# Patient Record
Sex: FEMALE | Race: WHITE | NOT HISPANIC OR LATINO | Employment: FULL TIME | URBAN - NONMETROPOLITAN AREA
[De-identification: names, ages, dates, MRNs, and addresses within clinical notes are randomized per-mention and may not be internally consistent; named-entity substitution may affect disease eponyms.]

---

## 2017-03-07 ENCOUNTER — ALLIED HEALTH/NURSE VISIT (OUTPATIENT)
Dept: FAMILY MEDICINE | Facility: OTHER | Age: 32
End: 2017-03-07

## 2017-03-07 DIAGNOSIS — Z11.1 SCREENING EXAMINATION FOR PULMONARY TUBERCULOSIS: Primary | ICD-10-CM

## 2017-03-07 PROCEDURE — 86580 TB INTRADERMAL TEST: CPT

## 2017-03-07 PROCEDURE — 99207 ZZC NO CHARGE NURSE ONLY: CPT

## 2017-03-07 NOTE — NURSING NOTE
The patient is asked the following questions today and these are her answers:    -Have you had a mantoux administered in the past 30 days?    No  -Have you had a previous positive Mantoux.  No  -Have you received BCG in the past.  No  -Have you had a live vaccine  (MMR, Varicella, OPV, Yellow Fever) in the last 6 weeks.  No  -Have you had and active  viral or bacterial infection in the past 6 weeks.  No  -Have you received corticosteroids or immunosuppressive agents in the past 6 weeks.  No  -Have you been diagnosed with HIV?  No  -Do you have a maglinancy?  No      Patient here for mantoux intradermal test. Mantoux was placed on left forearm on 3/7/2017 at 9:55 AM.  Patient instructed to come back in 48-72 hours for results.  Prior to injection verified patient identity using patient's name and date of birth.  Meg Gavin MA     3/7/2017

## 2017-03-08 ENCOUNTER — OFFICE VISIT (OUTPATIENT)
Dept: FAMILY MEDICINE | Facility: OTHER | Age: 32
End: 2017-03-08
Payer: COMMERCIAL

## 2017-03-08 VITALS
HEART RATE: 92 BPM | WEIGHT: 161.7 LBS | SYSTOLIC BLOOD PRESSURE: 108 MMHG | TEMPERATURE: 96.7 F | HEIGHT: 66 IN | RESPIRATION RATE: 20 BRPM | DIASTOLIC BLOOD PRESSURE: 74 MMHG | OXYGEN SATURATION: 99 % | BODY MASS INDEX: 25.99 KG/M2

## 2017-03-08 DIAGNOSIS — Z12.4 ENCOUNTER FOR SCREENING FOR CERVICAL CANCER: ICD-10-CM

## 2017-03-08 DIAGNOSIS — Z13.1 SCREENING FOR DIABETES MELLITUS: ICD-10-CM

## 2017-03-08 DIAGNOSIS — Z13.6 CARDIOVASCULAR SCREENING; LDL GOAL LESS THAN 160: ICD-10-CM

## 2017-03-08 DIAGNOSIS — Z00.00 ROUTINE GENERAL MEDICAL EXAMINATION AT A HEALTH CARE FACILITY: Primary | ICD-10-CM

## 2017-03-08 DIAGNOSIS — Z30.41 ENCOUNTER FOR SURVEILLANCE OF CONTRACEPTIVE PILLS: ICD-10-CM

## 2017-03-08 LAB
CHOLEST SERPL-MCNC: 192 MG/DL
GLUCOSE SERPL-MCNC: 92 MG/DL (ref 70–99)
HDLC SERPL-MCNC: 50 MG/DL
LDLC SERPL CALC-MCNC: 126 MG/DL
NONHDLC SERPL-MCNC: 142 MG/DL
TRIGL SERPL-MCNC: 79 MG/DL

## 2017-03-08 PROCEDURE — 87624 HPV HI-RISK TYP POOLED RSLT: CPT | Performed by: NURSE PRACTITIONER

## 2017-03-08 PROCEDURE — 82947 ASSAY GLUCOSE BLOOD QUANT: CPT | Performed by: NURSE PRACTITIONER

## 2017-03-08 PROCEDURE — 99395 PREV VISIT EST AGE 18-39: CPT | Performed by: NURSE PRACTITIONER

## 2017-03-08 PROCEDURE — 80061 LIPID PANEL: CPT | Performed by: NURSE PRACTITIONER

## 2017-03-08 PROCEDURE — 36415 COLL VENOUS BLD VENIPUNCTURE: CPT | Performed by: NURSE PRACTITIONER

## 2017-03-08 PROCEDURE — G0145 SCR C/V CYTO,THINLAYER,RESCR: HCPCS | Performed by: NURSE PRACTITIONER

## 2017-03-08 RX ORDER — DESOGESTREL AND ETHINYL ESTRADIOL 0.15-0.03
1 KIT ORAL DAILY
Qty: 90 TABLET | Refills: 3 | Status: SHIPPED | OUTPATIENT
Start: 2017-03-08 | End: 2018-01-29

## 2017-03-08 ASSESSMENT — PAIN SCALES - GENERAL: PAINLEVEL: NO PAIN (0)

## 2017-03-08 NOTE — PATIENT INSTRUCTIONS
The results of the pap test take about 2 weeks to come back.  We will send a letter with these results and the recommendations for further pap tests in the future.       Labs will be done today.  I will call or send a letter with results.     Birth Control pills refilled.     Preventive Health Recommendations  Female Ages 26 - 39  Yearly exam:   See your health care provider every year in order to    Review health changes.     Discuss preventive care.      Review your medicines if you your doctor has prescribed any.    Until age 30: Get a Pap test every three years (more often if you have had an abnormal result).    After age 30: Talk to your doctor about whether you should have a Pap test every 3 years or have a Pap test with HPV screening every 5 years.   You do not need a Pap test if your uterus was removed (hysterectomy) and you have not had cancer.  You should be tested each year for STDs (sexually transmitted diseases), if you're at risk.   Talk to your provider about how often to have your cholesterol checked.  If you are at risk for diabetes, you should have a diabetes test (fasting glucose).  Shots: Get a flu shot each year. Get a tetanus shot every 10 years.   Nutrition:     Eat at least 5 servings of fruits and vegetables each day.    Eat whole-grain bread, whole-wheat pasta and brown rice instead of white grains and rice.    Talk to your provider about Calcium and Vitamin D.     Lifestyle    Exercise at least 150 minutes a week (30 minutes a day, 5 days of the week). This will help you control your weight and prevent disease.    Limit alcohol to one drink per day.    No smoking.     Wear sunscreen to prevent skin cancer.    See your dentist every six months for an exam and cleaning.

## 2017-03-08 NOTE — PROGRESS NOTES
SUBJECTIVE:     CC: Rosemarie Wade is an 31 year old woman who presents for preventive health visit.     Healthy Habits:    Do you get at least three servings of calcium containing foods daily (dairy, green leafy vegetables, etc.)? yes    Amount of exercise or daily activities, outside of work: 5 day(s) per week    Problems taking medications regularly No    Medication side effects: No    Have you had an eye exam in the past two years? no    Do you see a dentist twice per year? yes    Do you have sleep apnea, excessive snoring or daytime drowsiness?no        Today's PHQ-2 Score:   PHQ-2 ( 1999 Pf[employer]) 3/8/2017 9/4/2015   Q1: Little interest or pleasure in doing things 0 0   Q2: Feeling down, depressed or hopeless 0 0   PHQ-2 Score 0 0   Little interest or pleasure in doing things - Not at all   Feeling down, depressed or hopeless - Not at all   PHQ-2 Score - 0       Abuse: Current or Past(Physical, Sexual or Emotional)- No  Do you feel safe in your environment - Yes    Social History   Substance Use Topics     Smoking status: Never Smoker     Smokeless tobacco: Never Used     Alcohol use No     The patient does not drink >3 drinks per day nor >7 drinks per week.    Recent Labs   Lab Test  08/23/13   0942   CHOL  233*   HDL  45*   LDL  162*   TRIG  134   CHOLHDLRATIO  5.0       Reviewed orders with patient.  Reviewed health maintenance and updated orders accordingly - Yes    Mammo Decision Support:  Mammogram not appropriate for this patient based on age.    Pertinent mammograms are reviewed under the imaging tab.  History of abnormal Pap smear: NO - age 30-65 PAP every 5 years with negative HPV co-testing recommended    Reviewed and updated as needed this visit by clinical staff  Tobacco  Allergies  Meds  Med Hx  Surg Hx  Fam Hx  Soc Hx        Reviewed and updated as needed this visit by Provider        Past Medical History   Diagnosis Date     NO ACTIVE PROBLEMS       Past Surgical History   Procedure  Laterality Date     Hc tooth extraction w/forcep       Obstetric History       T2      TAB0   SAB0   E0   M0   L2       # Outcome Date GA Lbr Juan/2nd Weight Sex Delivery Anes PTL Lv   2 Term 12 39w5d 04:00 / 01:07 10 lb 5.3 oz (4.685 kg) M Vag-Spont EPI N Y      Name: ESTHER SHEIKH      Apgar1:  7                Apgar5: 9   1 Term 10/25/09 39w0d 13:00 7 lb 11 oz (3.487 kg) F Vag-Vacuum  N Y      Name: Abigail      Obstetric Comments   EDC 3/8/2012 based on LMP.  Happy to be pregnant.       ROS:  C: NEGATIVE for fever, chills, change in weight  I: NEGATIVE for worrisome rashes, moles or lesions  E: NEGATIVE for vision changes or irritation  ENT: NEGATIVE for ear, mouth and throat problems  R: NEGATIVE for significant cough or SOB  B: NEGATIVE for masses, tenderness or discharge  CV: NEGATIVE for chest pain, palpitations or peripheral edema  GI: NEGATIVE for nausea, abdominal pain, heartburn, or change in bowel habits  : NEGATIVE for unusual urinary or vaginal symptoms. Periods are regular.  M: NEGATIVE for significant arthralgias or myalgia  N: NEGATIVE for weakness, dizziness or paresthesias  P: NEGATIVE for changes in mood or affect    Problem list, Medication list, Allergies, and Medical/Social/Surgical histories reviewed in [facility] and updated as appropriate.  BP Readings from Last 3 Encounters:   17 108/74   09/04/15 106/68   14 118/60    Wt Readings from Last 3 Encounters:   17 161 lb 11.2 oz (73.3 kg)   09/04/15 154 lb 14.4 oz (70.3 kg)   14 142 lb 9.6 oz (64.7 kg)                  Patient Active Problem List   Diagnosis     Contraception     CARDIOVASCULAR SCREENING; LDL GOAL LESS THAN 160     Hyperlipidemia LDL goal <160     Past Surgical History   Procedure Laterality Date     Hc tooth extraction w/forcep         Social History   Substance Use Topics     Smoking status: Never Smoker     Smokeless tobacco: Never Used     Alcohol use No     Family History  "  Problem Relation Age of Onset     DIABETES Maternal Grandmother      CANCER Maternal Grandmother      ovarian CA     Hypertension Paternal Grandmother      CEREBROVASCULAR DISEASE Paternal Grandmother      Hypertension Paternal Grandfather      HEART DISEASE Paternal Grandfather      MI at age 56     Hypertension Brother          Current Outpatient Prescriptions   Medication Sig Dispense Refill     desogestrel-ethinyl estradiol (DESOGEN) 0.15-30 MG-MCG per tablet Take 1 tablet by mouth daily 90 tablet 3     [DISCONTINUED] desogestrel-ethinyl estradiol (DESOGEN) 0.15-30 MG-MCG per tablet Take 1 tablet by mouth daily 90 tablet 1     Allergies   Allergen Reactions     No Known Drug Allergies      OBJECTIVE:     /74  Pulse 92  Temp 96.7  F (35.9  C) (Tympanic)  Resp 20  Ht 5' 6\" (1.676 m)  Wt 161 lb 11.2 oz (73.3 kg)  LMP 03/01/2017  SpO2 99%  Breastfeeding? No  BMI 26.1 kg/m2  EXAM:  GENERAL: healthy, alert and no distress  EYES: Eyes grossly normal to inspection, PERRL and conjunctivae and sclerae normal  HENT: ear canals and TM's normal, nose and mouth without ulcers or lesions  NECK: no adenopathy, no asymmetry, masses, or scars and thyroid normal to palpation  RESP: lungs clear to auscultation - no rales, rhonchi or wheezes  BREAST: normal without masses, tenderness or nipple discharge and no palpable axillary masses or adenopathy  CV: regular rate and rhythm, normal S1 S2, no S3 or S4, no murmur, click or rub, no peripheral edema and peripheral pulses strong  ABDOMEN: soft, nontender, no hepatosplenomegaly, no masses and bowel sounds normal   (female): normal female external genitalia, normal urethral meatus, vaginal mucosa pink, moist, well rugated, and normal cervix/adnexa/uterus without masses or discharge  MS: no gross musculoskeletal defects noted, no edema  SKIN: no suspicious lesions or rashes  NEURO: Normal strength and tone, mentation intact and speech normal  PSYCH: mentation appears normal, " "affect normal/bright    ASSESSMENT/PLAN:     1. Routine general medical examination at a health care facility      2. CARDIOVASCULAR SCREENING; LDL GOAL LESS THAN 160  - LIPID REFLEX TO DIRECT LDL PANEL    3. Encounter for surveillance of contraceptive pills  - desogestrel-ethinyl estradiol (DESOGEN) 0.15-30 MG-MCG per tablet; Take 1 tablet by mouth daily  Dispense: 90 tablet; Refill: 3    4. Encounter for screening for cervical cancer   - Pap imaged thin layer screen with HPV - recommended age 30 - 65  - HPV High Risk Types DNA Cervical    5. Screening for diabetes mellitus  - Glucose    COUNSELING:   Reviewed preventive health counseling, as reflected in patient instructions       Regular exercise       Healthy diet/nutrition       Vision screening       Contraception         reports that she has never smoked. She has never used smokeless tobacco.    Estimated body mass index is 26.1 kg/(m^2) as calculated from the following:    Height as of this encounter: 5' 6\" (1.676 m).    Weight as of this encounter: 161 lb 11.2 oz (73.3 kg).   Weight management plan: Discussed healthy diet and exercise guidelines and patient will follow up in 12 months in clinic to re-evaluate.    Counseling Resources:  ATP IV Guidelines  Pooled Cohorts Equation Calculator  Breast Cancer Risk Calculator  FRAX Risk Assessment  ICSI Preventive Guidelines  Dietary Guidelines for Americans, 2010  USDA's MyPlate  ASA Prophylaxis  Lung CA Screening    HILDA Watson [facility]  "

## 2017-03-08 NOTE — MR AVS SNAPSHOT
After Visit Summary   3/8/2017    Rosemarie Wade    MRN: [id number]           Patient Information     Date Of Birth          1985        Visit Information        Provider Department      3/8/2017 9:20 AM Maryse Washington APRN [facility]        Today's Diagnoses     Routine general medical examination at a health care facility    -  1    CARDIOVASCULAR SCREENING; LDL GOAL LESS THAN 160        Encounter for surveillance of contraceptive pills        Encounter for screening for cervical cancer         Screening for diabetes mellitus          Care Instructions    The results of the pap test take about 2 weeks to come back.  We will send a letter with these results and the recommendations for further pap tests in the future.       Labs will be done today.  I will call or send a letter with results.     Birth Control pills refilled.     Preventive Health Recommendations  Female Ages 26 - 39  Yearly exam:   See your health care provider every year in order to    Review health changes.     Discuss preventive care.      Review your medicines if you your doctor has prescribed any.    Until age 30: Get a Pap test every three years (more often if you have had an abnormal result).    After age 30: Talk to your doctor about whether you should have a Pap test every 3 years or have a Pap test with HPV screening every 5 years.   You do not need a Pap test if your uterus was removed (hysterectomy) and you have not had cancer.  You should be tested each year for STDs (sexually transmitted diseases), if you're at risk.   Talk to your provider about how often to have your cholesterol checked.  If you are at risk for diabetes, you should have a diabetes test (fasting glucose).  Shots: Get a flu shot each year. Get a tetanus shot every 10 years.   Nutrition:     Eat at least 5 servings of fruits and vegetables each day.    Eat whole-grain bread, whole-wheat pasta and brown rice instead of white  "grains and rice.    Talk to your provider about Calcium and Vitamin D.     Lifestyle    Exercise at least 150 minutes a week (30 minutes a day, 5 days of the week). This will help you control your weight and prevent disease.    Limit alcohol to one drink per day.    No smoking.     Wear sunscreen to prevent skin cancer.    See your dentist every six months for an exam and cleaning.          Follow-ups after your visit        Who to contact     If you have questions or need follow up information about today's clinic visit or your schedule please contact [facility] directly at [phone].  Normal or non-critical lab and imaging results will be communicated to you by Tintrihart, letter or phone within 4 business days after the clinic has received the results. If you do not hear from us within 7 days, please contact the clinic through [employer] or phone. If you have a critical or abnormal lab result, we will notify you by phone as soon as possible.  Submit refill requests through [employer] or call your pharmacy and they will forward the refill request to us. Please allow 3 business days for your refill to be completed.          Additional Information About Your Visit        MyChart Information     [employer] gives you secure access to your electronic health record. If you see a primary care provider, you can also send messages to your care team and make appointments. If you have questions, please call your primary care clinic.  If you do not have a primary care provider, please call [phone] and they will assist you.        Care EveryWhere ID     This is your Care EveryWhere ID. This could be used by other organizations to access your [place] medical records  [id number]        Your Vitals Were     Pulse Temperature Respirations Height Last Period Pulse Oximetry    92 96.7  F (35.9  C) (Tympanic) 20 5' 6\" (1.676 m) 03/01/2017 99%    Breastfeeding? BMI (Body Mass Index)                No 26.1 kg/m2           " Blood Pressure from Last 3 Encounters:   03/08/17 108/74   09/04/15 106/68   12/22/14 118/60    Weight from Last 3 Encounters:   03/08/17 161 lb 11.2 oz (73.3 kg)   09/04/15 154 lb 14.4 oz (70.3 kg)   12/22/14 142 lb 9.6 oz (64.7 kg)              We Performed the Following     Glucose     HPV High Risk Types DNA Cervical     LIPID REFLEX TO DIRECT LDL PANEL     Pap imaged thin layer screen with HPV - recommended age 30 - 65          Where to get your medicines      These medications were sent to [facility] [place], [place] - [address]  [address] [facility] [address]     Phone:  [phone]     desogestrel-ethinyl estradiol 0.15-30 MG-MCG per tablet          Primary Care Provider Office Phone # Fax #    HILDA Watson [phone]       [facility] [address] [facility] [address]        Thank you!     Thank you for choosing [facility]  for your care. Our goal is always to provide you with excellent care. Hearing back from our patients is one way we can continue to improve our services. Please take a few minutes to complete the written survey that you may receive in the mail after your visit with us. Thank you!             Your Updated Medication List - Protect others around you: Learn how to safely use, store and throw away your medicines at www.disposemymeds.org.          This list is accurate as of: 3/8/17  9:38 AM.  Always use your most recent med list.                   Brand Name Dispense Instructions for use    desogestrel-ethinyl estradiol 0.15-30 MG-MCG per tablet    DESOGEN    90 tablet    Take 1 tablet by mouth daily

## 2017-03-08 NOTE — NURSING NOTE
"Chief Complaint   Patient presents with     Physical     fasting       Initial /74  Pulse 92  Temp 96.7  F (35.9  C) (Tympanic)  Resp 20  Ht 5' 6\" (1.676 m)  Wt 161 lb 11.2 oz (73.3 kg)  LMP 03/01/2017  SpO2 99%  Breastfeeding? No  BMI 26.1 kg/m2 Estimated body mass index is 26.1 kg/(m^2) as calculated from the following:    Height as of this encounter: 5' 6\" (1.676 m).    Weight as of this encounter: 161 lb 11.2 oz (73.3 kg).  Medication Reconciliation: complete   ................Paul Blue LPN,   March 8, 2017,      9:22 AM,   [facility]     "

## 2017-03-08 NOTE — LETTER
[facility]  [address] [facility] [address] [phone]      March 15, 2017    Rosemarie Wade  [address] [facility] [address]    Dear Rosemarie,  We are happy to inform you that your PAP smear result from 3/8/17 is normal.  We are now able to do a follow up test on PAP smears. The DNA test is for HPV (Human Papilloma Virus). Cervical cancer is closely linked with certain types of HPV. Your result showed no evidence of high risk HPV.  Therefore we recommend you return in 5 years for your next pap smear and HPV test.  You will still need to return to the clinic every year for an annual exam and other preventive tests.  Please contact the clinic with any questions.  Sincerely,  HILDA Watson CNP/rlm

## 2017-03-10 LAB
COPATH REPORT: NORMAL
PAP: NORMAL

## 2017-03-13 LAB
FINAL DIAGNOSIS: NORMAL
HPV HR 12 DNA CVX QL NAA+PROBE: NEGATIVE
HPV16 DNA SPEC QL NAA+PROBE: NEGATIVE
HPV18 DNA SPEC QL NAA+PROBE: NEGATIVE
SPECIMEN DESCRIPTION: NORMAL

## 2017-06-20 ENCOUNTER — OFFICE VISIT (OUTPATIENT)
Dept: FAMILY MEDICINE | Facility: OTHER | Age: 32
End: 2017-06-20
Payer: COMMERCIAL

## 2017-06-20 VITALS
TEMPERATURE: 98.5 F | WEIGHT: 162.1 LBS | SYSTOLIC BLOOD PRESSURE: 100 MMHG | DIASTOLIC BLOOD PRESSURE: 60 MMHG | HEART RATE: 68 BPM | BODY MASS INDEX: 26.16 KG/M2 | RESPIRATION RATE: 16 BRPM

## 2017-06-20 DIAGNOSIS — T63.301A SPIDER BITE, ACCIDENTAL OR UNINTENTIONAL, INITIAL ENCOUNTER: ICD-10-CM

## 2017-06-20 DIAGNOSIS — L23.7 CONTACT DERMATITIS DUE TO POISON IVY: Primary | ICD-10-CM

## 2017-06-20 PROCEDURE — 99213 OFFICE O/P EST LOW 20 MIN: CPT | Performed by: PHYSICIAN ASSISTANT

## 2017-06-20 RX ORDER — METHYLPREDNISOLONE 4 MG
TABLET, DOSE PACK ORAL
Qty: 21 TABLET | Refills: 0 | Status: SHIPPED | OUTPATIENT
Start: 2017-06-20 | End: 2018-04-13

## 2017-06-20 RX ORDER — CLOBETASOL PROPIONATE 0.05 MG/G
GEL TOPICAL 2 TIMES DAILY
Qty: 45 G | Refills: 0 | Status: SHIPPED | OUTPATIENT
Start: 2017-06-20 | End: 2018-04-13

## 2017-06-20 ASSESSMENT — PAIN SCALES - GENERAL: PAINLEVEL: NO PAIN (0)

## 2017-06-20 NOTE — MR AVS SNAPSHOT
After Visit Summary   6/20/2017    Rosemarie Wade    MRN: [id number]           Patient Information     Date Of Birth          1985        Visit Information        Provider Department      6/20/2017 4:20 PM Scott Lopez PA-C [facility]        Today's Diagnoses     Contact dermatitis due to poison ivy    -  1    Spider bite, accidental or unintentional, initial encounter           Follow-ups after your visit        Who to contact     If you have questions or need follow up information about today's clinic visit or your schedule please contact [facility] directly at [phone].  Normal or non-critical lab and imaging results will be communicated to you by MyChart, letter or phone within 4 business days after the clinic has received the results. If you do not hear from us within 7 days, please contact the clinic through [employer] or phone. If you have a critical or abnormal lab result, we will notify you by phone as soon as possible.  Submit refill requests through [employer] or call your pharmacy and they will forward the refill request to us. Please allow 3 business days for your refill to be completed.          Additional Information About Your Visit        MyChart Information     [employer] gives you secure access to your electronic health record. If you see a primary care provider, you can also send messages to your care team and make appointments. If you have questions, please call your primary care clinic.  If you do not have a primary care provider, please call [phone] and they will assist you.        Care EveryWhere ID     This is your Care EveryWhere ID. This could be used by other organizations to access your [place] medical records  [id number]        Your Vitals Were     Pulse Temperature Respirations BMI (Body Mass Index)          68 98.5  F (36.9  C) (Oral) 16 26.16 kg/m2         Blood Pressure from Last 3 Encounters:   06/20/17 100/60   03/08/17 108/74    09/04/15 106/68    Weight from Last 3 Encounters:   06/20/17 162 lb 1.6 oz (73.5 kg)   03/08/17 161 lb 11.2 oz (73.3 kg)   09/04/15 154 lb 14.4 oz (70.3 kg)              Today, you had the following     No orders found for display         Today's Medication Changes          These changes are accurate as of: 6/20/17  4:33 PM.  If you have any questions, ask your nurse or doctor.               Start taking these medicines.        Dose/Directions    clobetasol 0.05 % Gel topical gel   Commonly known as:  TEMOVATE   Used for:  Contact dermatitis due to poison ivy   Started by:  Scott Lopez PA-C        Apply topically 2 times daily   Quantity:  45 g   Refills:  0       methylPREDNISolone 4 MG tablet   Commonly known as:  MEDROL DOSEPAK   Used for:  Contact dermatitis due to poison ivy   Started by:  Scott Lopez PA-C        Follow package instructions   Quantity:  21 tablet   Refills:  0            Where to get your medicines      These medications were sent to [facility] [place] [address]     Phone:  [phone]     clobetasol 0.05 % Gel topical gel    methylPREDNISolone 4 MG tablet                Primary Care Provider Office Phone # Fax #    HILDA Watson [phone]       [facility] [address] [facility] [address]        Thank you!     Thank you for choosing [facility]  for your care. Our goal is always to provide you with excellent care. Hearing back from our patients is one way we can continue to improve our services. Please take a few minutes to complete the written survey that you may receive in the mail after your visit with us. Thank you!             Your Updated Medication List - Protect others around you: Learn how to safely use, store and throw away your medicines at www.disposemymeds.org.          This list is accurate as of: 6/20/17  4:33 PM.  Always use your most recent med list.                    Brand Name Dispense Instructions for use    clobetasol 0.05 % Gel topical gel    TEMOVATE    45 g    Apply topically 2 times daily       desogestrel-ethinyl estradiol 0.15-30 MG-MCG per tablet    DESOGEN    90 tablet    Take 1 tablet by mouth daily       methylPREDNISolone 4 MG tablet    MEDROL DOSEPAK    21 tablet    Follow package instructions

## 2017-06-20 NOTE — PROGRESS NOTES
SUBJECTIVE:                                                    Rosemarie Wade is a 31 year old female who presents to clinic today for the following health issues:      Rash     Onset: 2 weeks    Description:   Location: all over  Character: raised, draining, red, linear blisters  Itching (Pruritis): YES    Progression of Symptoms:  waxing and waning    Accompanying Signs & Symptoms:  Fever: no   Body aches or joint pain: no   Sore throat symptoms: no   Recent cold symptoms: no    History:   Previous similar rash: YES- poison ivy each year like this.    Precipitating factors:   Exposure to similar rash: no   New exposures: None   Recent travel: no     Alleviating factors:  Nothing helps     Therapies Tried and outcome:   Concern - insect bite on inside left elbow     Onset: 4-5 days    Description:   mild induration and some eschar at the site of the bite.  Spider bite suspected.    Intensity: mild    Progression of Symptoms:  Slowly improving.    Accompanying Signs & Symptoms:  Mild discomfort       Previous history of similar problem:   denies    Precipitating factors:   Worsened by: nothing     Alleviating factors:  Improved by: nothing       Therapies Tried and outcome:     Problem list and histories reviewed & adjusted, as indicated.  Additional history: as documented    Patient Active Problem List   Diagnosis     Contraception     CARDIOVASCULAR SCREENING; LDL GOAL LESS THAN 160     Hyperlipidemia LDL goal <160     Past Surgical History:   Procedure Laterality Date     HC TOOTH EXTRACTION W/FORCEP         Social History   Substance Use Topics     Smoking status: Never Smoker     Smokeless tobacco: Never Used     Alcohol use No     Family History   Problem Relation Age of Onset     DIABETES Maternal Grandmother      CANCER Maternal Grandmother      ovarian CA     Hypertension Paternal Grandmother      CEREBROVASCULAR DISEASE Paternal Grandmother      Hypertension Paternal Grandfather      HEART DISEASE  Paternal Grandfather      MI at age 56     Hypertension Brother            Reviewed and updated as needed this visit by clinical staff  Tobacco  Allergies  Med Hx  Surg Hx  Fam Hx  Soc Hx      Reviewed and updated as needed this visit by Provider         ROS:  Constitutional, HEENT, cardiovascular, pulmonary, gi and gu systems are negative, except as otherwise noted.    OBJECTIVE:                                                    /60 (BP Location: Left arm, Patient Position: Chair, Cuff Size: Adult Regular)  Pulse 68  Temp 98.5  F (36.9  C) (Oral)  Resp 16  Wt 162 lb 1.6 oz (73.5 kg)  BMI 26.16 kg/m2  Body mass index is 26.16 kg/(m^2).  GENERAL: healthy, alert and no distress  RESP: lungs clear to auscultation - no rales, rhonchi or wheezes  CV: regular rate and rhythm, normal S1 S2, no S3 or S4, no murmur, click or rub, no peripheral edema and peripheral pulses strong  SKIN: scattered PI rash noted to the arms and left lateral chest wall.  Evidence of excoriation noted.  An apparent spider bite to the left medial elbow with mild induration surrounding this in rough diameter of 1.5cm total.  NEURO: Normal strength and tone, mentation intact and speech normal  PSYCH: mentation appears normal, affect normal/bright    Diagnostic Test Results:  No results found for this or any previous visit (from the past 24 hour(s)).     ASSESSMENT/PLAN:                                                    1. Contact dermatitis due to poison ivy  As directed  - methylPREDNISolone (MEDROL DOSEPAK) 4 MG tablet; Follow package instructions  Dispense: 21 tablet; Refill: 0  - clobetasol (TEMOVATE) 0.05 % GEL topical gel; Apply topically 2 times daily  Dispense: 45 g; Refill: 0    2. Spider bite, accidental or unintentional, initial encounter  Observe as this seems to be getting better on its own.  ROISAC PRN.  Scott Gregory PA-C  [facility]

## 2017-06-20 NOTE — NURSING NOTE
"Chief Complaint   Patient presents with     Derm Problem     check rash all over, x 2 weeks     Insect Bites     left elbow,x 4-5- days       Initial /60 (BP Location: Left arm, Patient Position: Chair, Cuff Size: Adult Regular)  Pulse 68  Temp 98.5  F (36.9  C) (Oral)  Resp 16  Wt 162 lb 1.6 oz (73.5 kg)  BMI 26.16 kg/m2 Estimated body mass index is 26.16 kg/(m^2) as calculated from the following:    Height as of 3/8/17: 5' 6\" (1.676 m).    Weight as of this encounter: 162 lb 1.6 oz (73.5 kg).  Medication Reconciliation: complete     Zaria PETE LPN      "

## 2018-01-29 ENCOUNTER — MYC MEDICAL ADVICE (OUTPATIENT)
Dept: FAMILY MEDICINE | Facility: OTHER | Age: 33
End: 2018-01-29

## 2018-01-29 DIAGNOSIS — Z30.41 ENCOUNTER FOR SURVEILLANCE OF CONTRACEPTIVE PILLS: ICD-10-CM

## 2018-01-29 NOTE — TELEPHONE ENCOUNTER
"Requested Prescriptions   Pending Prescriptions Disp Refills     desogestrel-ethinyl estradiol (DESOGEN) 0.15-30 MG-MCG per tablet 90 tablet 3     Sig: Take 1 tablet by mouth daily    Contraceptives Protocol Passed    1/29/2018 10:13 AM       Passed - Patient is not a current smoker if age is 35 or older       Passed - Recent or future visit with authorizing provider's specialty    Patient had office visit in the last year or has a visit in the next 30 days with authorizing provider.  See \"Patient Info\" tab in inbasket, or \"Choose Columns\" in Meds & Orders section of the refill encounter.            Passed - No active pregnancy on record       Passed - No positive pregnancy test in past 12 months          Last Written Prescription Date:  3/8/17  Last Fill Quantity: 90,  # refills: 3   Last Office Visit with [facility], P or [facility] prescribing provider:  3/8/17   Future Office Visit:       "

## 2018-01-30 RX ORDER — DESOGESTREL AND ETHINYL ESTRADIOL 0.15-0.03
1 KIT ORAL DAILY
Qty: 90 TABLET | Refills: 0 | Status: SHIPPED | OUTPATIENT
Start: 2018-01-30 | End: 2018-04-13

## 2018-01-30 NOTE — TELEPHONE ENCOUNTER
Prescription approved per [facility] Refill Protocol.    Celeste Horvath RN  [facility]

## 2018-04-13 ENCOUNTER — OFFICE VISIT (OUTPATIENT)
Dept: FAMILY MEDICINE | Facility: OTHER | Age: 33
End: 2018-04-13
Payer: COMMERCIAL

## 2018-04-13 VITALS
RESPIRATION RATE: 20 BRPM | DIASTOLIC BLOOD PRESSURE: 66 MMHG | WEIGHT: 152 LBS | BODY MASS INDEX: 24.43 KG/M2 | TEMPERATURE: 98.6 F | OXYGEN SATURATION: 100 % | HEIGHT: 66 IN | SYSTOLIC BLOOD PRESSURE: 104 MMHG | HEART RATE: 89 BPM

## 2018-04-13 DIAGNOSIS — Z30.41 ENCOUNTER FOR SURVEILLANCE OF CONTRACEPTIVE PILLS: ICD-10-CM

## 2018-04-13 DIAGNOSIS — Z00.00 ROUTINE GENERAL MEDICAL EXAMINATION AT A HEALTH CARE FACILITY: Primary | ICD-10-CM

## 2018-04-13 PROCEDURE — 99395 PREV VISIT EST AGE 18-39: CPT | Performed by: NURSE PRACTITIONER

## 2018-04-13 RX ORDER — DESOGESTREL AND ETHINYL ESTRADIOL 0.15-0.03
1 KIT ORAL DAILY
Qty: 90 TABLET | Refills: 3 | Status: SHIPPED | OUTPATIENT
Start: 2018-04-13 | End: 2019-03-24

## 2018-04-13 NOTE — PATIENT INSTRUCTIONS
Try Zyrtec, Allegra or Claritin if needed for you ear symptoms.       Preventive Health Recommendations  Female Ages 26 - 39  Yearly exam:   See your health care provider every year in order to    Review health changes.     Discuss preventive care.      Review your medicines if you your doctor has prescribed any.    Until age 30: Get a Pap test every three years (more often if you have had an abnormal result).    After age 30: Talk to your doctor about whether you should have a Pap test every 3 years or have a Pap test with HPV screening every 5 years.   You do not need a Pap test if your uterus was removed (hysterectomy) and you have not had cancer.  You should be tested each year for STDs (sexually transmitted diseases), if you're at risk.   Talk to your provider about how often to have your cholesterol checked.  If you are at risk for diabetes, you should have a diabetes test (fasting glucose).  Shots: Get a flu shot each year. Get a tetanus shot every 10 years.   Nutrition:     Eat at least 5 servings of fruits and vegetables each day.    Eat whole-grain bread, whole-wheat pasta and brown rice instead of white grains and rice.    Talk to your provider about Calcium and Vitamin D.     Lifestyle    Exercise at least 150 minutes a week (30 minutes a day, 5 days of the week). This will help you control your weight and prevent disease.    Limit alcohol to one drink per day.    No smoking.     Wear sunscreen to prevent skin cancer.    See your dentist every six months for an exam and cleaning.

## 2018-04-13 NOTE — NURSING NOTE
"Chief Complaint   Patient presents with     Physical     no pap needed today       Initial /66  Pulse 89  Temp 98.6  F (37  C) (Tympanic)  Resp 20  Ht 5' 6\" (1.676 m)  Wt 152 lb (68.9 kg)  LMP 03/17/2018  SpO2 100%  Breastfeeding? No  BMI 24.53 kg/m2 Estimated body mass index is 24.53 kg/(m^2) as calculated from the following:    Height as of this encounter: 5' 6\" (1.676 m).    Weight as of this encounter: 152 lb (68.9 kg).  Medication Reconciliation: complete   ................Paul Blue LPN,   April 13, 2018,      1:47 PM,   [facility]    "

## 2018-04-13 NOTE — PROGRESS NOTES
SUBJECTIVE:   CC: Rosemaire Wade is an 32 year old woman who presents for preventive health visit.     Physical   Annual:     Getting at least 3 servings of Calcium per day::  Yes    Bi-annual eye exam::  NO    Dental care twice a year::  Yes    Sleep apnea or symptoms of sleep apnea::  None    Diet::  Regular (no restrictions)    Taking medications regularly::  Yes    Medication side effects::  None    Additional concerns today::  YES                  Today's PHQ-2 Score:   PHQ-2 ( 1999 [employer]) 4/13/2018   Q1: Little interest or pleasure in doing things 0   Q2: Feeling down, depressed or hopeless 0   PHQ-2 Score 0   Q1: Little interest or pleasure in doing things Not at all   Q2: Feeling down, depressed or hopeless Not at all   PHQ-2 Score 0       Abuse: Current or Past(Physical, Sexual or Emotional)- No  Do you feel safe in your environment - Yes    Social History   Substance Use Topics     Smoking status: Never Smoker     Smokeless tobacco: Never Used     Alcohol use No     Alcohol Use 4/13/2018   If you drink alcohol do you typically have greater than 3 drinks per day OR greater than 7 drinks per week? No     Reviewed orders with patient.  Reviewed health maintenance and updated orders accordingly - Yes  Labs reviewed in EPIC  BP Readings from Last 3 Encounters:   04/13/18 104/66   06/20/17 100/60   03/08/17 108/74    Wt Readings from Last 3 Encounters:   04/13/18 152 lb (68.9 kg)   06/20/17 162 lb 1.6 oz (73.5 kg)   03/08/17 161 lb 11.2 oz (73.3 kg)                  Patient Active Problem List   Diagnosis     Contraception     CARDIOVASCULAR SCREENING; LDL GOAL LESS THAN 160     Hyperlipidemia LDL goal <160     Past Surgical History:   Procedure Laterality Date     HC TOOTH EXTRACTION W/FORCEP         Social History   Substance Use Topics     Smoking status: Never Smoker     Smokeless tobacco: Never Used     Alcohol use No     Family History   Problem Relation Age of Onset     DIABETES Maternal  "Grandmother      CANCER Maternal Grandmother      ovarian CA     Hypertension Paternal Grandmother      CEREBROVASCULAR DISEASE Paternal Grandmother      Hypertension Paternal Grandfather      HEART DISEASE Paternal Grandfather      MI at age 56     Hypertension Brother          Allergies   Allergen Reactions     No Known Drug Allergies        Mammogram not appropriate for this patient based on age.    Pertinent mammograms are reviewed under the imaging tab.  History of abnormal Pap smear: NO - age 30-65 PAP every 5 years with negative HPV co-testing recommended    Reviewed and updated as needed this visit by clinical staff  Tobacco  Allergies  Meds  Soc Hx        Reviewed and updated as needed this visit by Provider        Past Medical History:   Diagnosis Date     NO ACTIVE PROBLEMS       Past Surgical History:   Procedure Laterality Date     HC TOOTH EXTRACTION W/FORCEP         Review of Systems  C: NEGATIVE for fever, chills, change in weight  I: NEGATIVE for worrisome rashes, moles or lesions  E: NEGATIVE for vision changes or irritation  ENT: NEGATIVE for ear, mouth and throat problems  R: NEGATIVE for significant cough or SOB  B: NEGATIVE for masses, tenderness or discharge  CV: NEGATIVE for chest pain, palpitations or peripheral edema  GI: NEGATIVE for nausea, abdominal pain, heartburn, or change in bowel habits  : NEGATIVE for unusual urinary or vaginal symptoms. Periods are regular.  M: NEGATIVE for significant arthralgias or myalgia  N: NEGATIVE for weakness, dizziness or paresthesias  P: NEGATIVE for changes in mood or affect     OBJECTIVE:   /66  Pulse 89  Temp 98.6  F (37  C) (Tympanic)  Resp 20  Ht 5' 6\" (1.676 m)  Wt 152 lb (68.9 kg)  LMP 03/17/2018  SpO2 100%  Breastfeeding? No  BMI 24.53 kg/m2  Physical Exam  GENERAL: healthy, alert and no distress  EYES: Eyes grossly normal to inspection, PERRL and conjunctivae and sclerae normal  HENT: ear canals and TM's normal, nose and " "mouth without ulcers or lesions  NECK: no adenopathy, no asymmetry, masses, or scars and thyroid normal to palpation  RESP: lungs clear to auscultation - no rales, rhonchi or wheezes  CV: regular rate and rhythm, normal S1 S2, no S3 or S4, no murmur, click or rub, no peripheral edema and peripheral pulses strong  ABDOMEN: soft, nontender, no hepatosplenomegaly, no masses and bowel sounds normal  MS: no gross musculoskeletal defects noted, no edema  SKIN: no suspicious lesions or rashes  NEURO: Normal strength and tone, mentation intact and speech normal  PSYCH: mentation appears normal, affect normal/bright    ASSESSMENT/PLAN:   1. Routine general medical examination at a health care facility    2. Encounter for surveillance of contraceptive pills  - desogestrel-ethinyl estradiol (DESOGEN) 0.15-30 MG-MCG per tablet; Take 1 tablet by mouth daily  Dispense: 90 tablet; Refill: 3    COUNSELING:  Reviewed preventive health counseling, as reflected in patient instructions       Regular exercise       Healthy diet/nutrition       Contraception       Family planning         reports that she has never smoked. She has never used smokeless tobacco.    Estimated body mass index is 24.53 kg/(m^2) as calculated from the following:    Height as of this encounter: 5' 6\" (1.676 m).    Weight as of this encounter: 152 lb (68.9 kg).       Counseling Resources:  ATP IV Guidelines  Pooled Cohorts Equation Calculator  Breast Cancer Risk Calculator  FRAX Risk Assessment  ICSI Preventive Guidelines  Dietary Guidelines for Americans, 2010  USDA's MyPlate  ASA Prophylaxis  Lung CA Screening    Maryse Washington, HILDA [facility]  "

## 2018-04-13 NOTE — MR AVS SNAPSHOT
After Visit Summary   4/13/2018    Rosemarie Wade    MRN: [id number]           Patient Information     Date Of Birth          1985        Visit Information        Provider Department      4/13/2018 1:40 PM Maryse Washington APRN [facility]        Today's Diagnoses     Routine general medical examination at a health care facility    -  1    Encounter for surveillance of contraceptive pills          Care Instructions    Try Zyrtec, Allegra or Claritin if needed for you ear symptoms.       Preventive Health Recommendations  Female Ages 26 - 39  Yearly exam:   See your health care provider every year in order to    Review health changes.     Discuss preventive care.      Review your medicines if you your doctor has prescribed any.    Until age 30: Get a Pap test every three years (more often if you have had an abnormal result).    After age 30: Talk to your doctor about whether you should have a Pap test every 3 years or have a Pap test with HPV screening every 5 years.   You do not need a Pap test if your uterus was removed (hysterectomy) and you have not had cancer.  You should be tested each year for STDs (sexually transmitted diseases), if you're at risk.   Talk to your provider about how often to have your cholesterol checked.  If you are at risk for diabetes, you should have a diabetes test (fasting glucose).  Shots: Get a flu shot each year. Get a tetanus shot every 10 years.   Nutrition:     Eat at least 5 servings of fruits and vegetables each day.    Eat whole-grain bread, whole-wheat pasta and brown rice instead of white grains and rice.    Talk to your provider about Calcium and Vitamin D.     Lifestyle    Exercise at least 150 minutes a week (30 minutes a day, 5 days of the week). This will help you control your weight and prevent disease.    Limit alcohol to one drink per day.    No smoking.     Wear sunscreen to prevent skin cancer.    See your dentist every six  "months for an exam and cleaning.            Follow-ups after your visit        Who to contact     If you have questions or need follow up information about today's clinic visit or your schedule please contact [facility] directly at [phone].  Normal or non-critical lab and imaging results will be communicated to you by CellErahart, letter or phone within 4 business days after the clinic has received the results. If you do not hear from us within 7 days, please contact the clinic through CellErahart or phone. If you have a critical or abnormal lab result, we will notify you by phone as soon as possible.  Submit refill requests through [employer]4Life or call your pharmacy and they will forward the refill request to us. Please allow 3 business days for your refill to be completed.          Additional Information About Your Visit        CellErahart Information     D[employer]4Life gives you secure access to your electronic health record. If you see a primary care provider, you can also send messages to your care team and make appointments. If you have questions, please call your primary care clinic.  If you do not have a primary care provider, please call [phone] and they will assist you.        Care EveryWhere ID     This is your Care EveryWhere ID. This could be used by other organizations to access your [facility] medical records  [id number]        Your Vitals Were     Pulse Temperature Respirations Height Last Period Pulse Oximetry    89 98.6  F (37  C) (Tympanic) 20 5' 6\" (1.676 m) 03/17/2018 100%    Breastfeeding? BMI (Body Mass Index)                No 24.53 kg/m2           Blood Pressure from Last 3 Encounters:   04/13/18 104/66   06/20/17 100/60   03/08/17 108/74    Weight from Last 3 Encounters:   04/13/18 152 lb (68.9 kg)   06/20/17 162 lb 1.6 oz (73.5 kg)   03/08/17 161 lb 11.2 oz (73.3 kg)              Today, you had the following     No orders found for display         Where to get your medicines      These " medications were sent to [facility] [place], [place] - [address]   [address] , [facility] [address]     Phone:  [phone]     desogestrel-ethinyl estradiol 0.15-30 MG-MCG per tablet          Primary Care Provider Office Phone # Fax #    HILDA Watson [phone]       [address] [facility] [address]        Equal Access to Services     KADI BUTT : Hadii aad ku hadasho Soomaali, waaxda luqadaha, qaybta kaalmada adeegyada, waxay idiin hayaan adeeg mariellaaraesvin pereira . So [facility] [phone].    ATENCIÓN: Si habla español, tiene a dudley disposición servicios gratuitos de asistencia lingüística. [facility] [phone].    We comply with applicable federal civil rights laws and [place] laws. We do not discriminate on the basis of race, color, national origin, age, disability, sex, sexual orientation, or gender identity.            Thank you!     Thank you for choosing [facility]  for your care. Our goal is always to provide you with excellent care. Hearing back from our patients is one way we can continue to improve our services. Please take a few minutes to complete the written survey that you may receive in the mail after your visit with us. Thank you!             Your Updated Medication List - Protect others around you: Learn how to safely use, store and throw away your medicines at www.disposemymeds.org.          This list is accurate as of 4/13/18  1:54 PM.  Always use your most recent med list.                   Brand Name Dispense Instructions for use Diagnosis    desogestrel-ethinyl estradiol 0.15-30 MG-MCG per tablet    DESOGEN    90 tablet    Take 1 tablet by mouth daily    Encounter for surveillance of contraceptive pills

## 2018-06-27 ENCOUNTER — OFFICE VISIT (OUTPATIENT)
Dept: URGENT CARE | Facility: RETAIL CLINIC | Age: 33
End: 2018-06-27
Payer: COMMERCIAL

## 2018-06-27 ENCOUNTER — MYC MEDICAL ADVICE (OUTPATIENT)
Dept: FAMILY MEDICINE | Facility: OTHER | Age: 33
End: 2018-06-27

## 2018-06-27 VITALS — SYSTOLIC BLOOD PRESSURE: 105 MMHG | OXYGEN SATURATION: 100 % | DIASTOLIC BLOOD PRESSURE: 71 MMHG | HEART RATE: 66 BPM

## 2018-06-27 DIAGNOSIS — L23.7 CONTACT DERMATITIS DUE TO POISON IVY: Primary | ICD-10-CM

## 2018-06-27 PROCEDURE — 99213 OFFICE O/P EST LOW 20 MIN: CPT | Performed by: FAMILY MEDICINE

## 2018-06-27 RX ORDER — PREDNISONE 20 MG/1
TABLET ORAL
Qty: 15 TABLET | Refills: 0 | Status: SHIPPED | OUTPATIENT
Start: 2018-06-27 | End: 2019-02-05

## 2018-06-27 NOTE — PROGRESS NOTES
SUBJECTIVE:  Rosemarie Wade is a 32 year old female who presents to the clinic today for a rash.  Onset of rash was 3 day(s) ago.   Rash is gradual onset.  Location of the rash: arm, lower, arm, upper and face.  Quality/symptoms of rash: itching and red   Symptoms are moderate and rash seems to be worsening.  Previous history of a similar rash? Yes: PI  Recent exposure history: poison ivy    Associated symptoms include: nothing.    Past Medical History:   Diagnosis Date     NO ACTIVE PROBLEMS      Current Outpatient Prescriptions   Medication Sig Dispense Refill     desogestrel-ethinyl estradiol (DESOGEN) 0.15-30 MG-MCG per tablet Take 1 tablet by mouth daily 90 tablet 3     predniSONE (DELTASONE) 20 MG tablet 2 daily for 5 days. 1 daily for 5 days 15 tablet 0     History   Smoking Status     Never Smoker   Smokeless Tobacco     Never Used       ROS:  Review of systems negative except as stated above.    EXAM:   /71 (BP Location: Right arm, Patient Position: Chair, Cuff Size: Adult Regular)  Pulse 66  SpO2 100%  GENERAL: alert, no acute distress.  SKIN: Rash description:    Distribution: area lateral to rt eye, both forearms and lt upper arm.  Typical linear PI rash    ASSESSMENT:  Poison ivy    PLAN:  Patient has Clobetazole, add Prednisone taper  Follow up with primary care provider if no improvement.

## 2018-06-27 NOTE — MR AVS SNAPSHOT
After Visit Summary   6/27/2018    Rosemarie Wade    MRN: [id number]           Patient Information     Date Of Birth          1985        Visit Information        Provider Department      6/27/2018 4:10 PM Vidal Montana MD [facility]'s Diagnoses     Contact dermatitis due to poison ivy    -  1       Follow-ups after your visit        Who to contact     You can reach your care team any time of the day by calling [phone].  Notification of test results:  If you have an abnormal lab result, we will notify you by phone as soon as possible.         Additional Information About Your Visit        MyChart Information     [employer] gives you secure access to your electronic health record. If you see a primary care provider, you can also send messages to your care team and make appointments. If you have questions, please call your primary care clinic.  If you do not have a primary care provider, please call [phone] and they will assist you.        Care EveryWhere ID     This is your Care EveryWhere ID. This could be used by other organizations to access your [place] medical records  [id number]        Your Vitals Were     Pulse Pulse Oximetry                66 100%           Blood Pressure from Last 3 Encounters:   06/27/18 105/71   04/13/18 104/66   06/20/17 100/60    Weight from Last 3 Encounters:   04/13/18 152 lb (68.9 kg)   06/20/17 162 lb 1.6 oz (73.5 kg)   03/08/17 161 lb 11.2 oz (73.3 kg)              Today, you had the following     No orders found for display         Today's Medication Changes          These changes are accurate as of 6/27/18  4:26 PM.  If you have any questions, ask your nurse or doctor.               Start taking these medicines.        Dose/Directions    predniSONE 20 MG tablet   Commonly known as:  DELTASONE   Used for:  Contact dermatitis due to poison ivy   Started by:  Vidal Montana MD        2 daily for 5 days. 1 daily  for 5 days   Quantity:  15 tablet   Refills:  0            Where to get your medicines      These medications were sent to [place], [place] - [address]  [address] , [facility] [address]     Phone:  [phone]     predniSONE 20 MG tablet                Primary Care Provider Office Phone # Fax #    HILDA Watson [phone]       [address] [facility], located within [facility] [address]        Equal Access to Services     KADI BUTT : Hadii aad ku hadasho Soomaali, waaxda luqadaha, qaybta kaalmada adeegyada, waxay idiin hayaan adeeg kharash la'al . So [facility] [phone].    ATENCIÓN: Si habla español, tiene a dudley disposición servicios gratuitos de asistencia lingüística. [facility] [phone].    We comply with applicable federal civil rights laws and [place] laws. We do not discriminate on the basis of race, color, national origin, age, disability, sex, sexual orientation, or gender identity.            Thank you!     Thank you for choosing [facility]  for your care. Our goal is always to provide you with excellent care. Hearing back from our patients is one way we can continue to improve our services. Please take a few minutes to complete the written survey that you may receive in the mail after your visit with us. Thank you!             Your Updated Medication List - Protect others around you: Learn how to safely use, store and throw away your medicines at www.disposemymeds.org.          This list is accurate as of 6/27/18  4:26 PM.  Always use your most recent med list.                   Brand Name Dispense Instructions for use Diagnosis    desogestrel-ethinyl estradiol 0.15-30 MG-MCG per tablet    DESOGEN    90 tablet    Take 1 tablet by mouth daily    Encounter for surveillance of contraceptive pills       predniSONE 20 MG tablet    DELTASONE    15 tablet    2 daily for 5 days. 1 daily for 5 days    Contact dermatitis due to poison ivy

## 2019-02-05 ENCOUNTER — OFFICE VISIT (OUTPATIENT)
Dept: URGENT CARE | Facility: RETAIL CLINIC | Age: 34
End: 2019-02-05

## 2019-02-05 VITALS — DIASTOLIC BLOOD PRESSURE: 70 MMHG | HEART RATE: 97 BPM | SYSTOLIC BLOOD PRESSURE: 104 MMHG | TEMPERATURE: 99.4 F

## 2019-02-05 DIAGNOSIS — J02.9 ACUTE PHARYNGITIS, UNSPECIFIED ETIOLOGY: Primary | ICD-10-CM

## 2019-02-05 LAB — S PYO AG THROAT QL IA.RAPID: ABNORMAL

## 2019-02-05 PROCEDURE — 87880 STREP A ASSAY W/OPTIC: CPT | Mod: QW | Performed by: INTERNAL MEDICINE

## 2019-02-05 PROCEDURE — 99213 OFFICE O/P EST LOW 20 MIN: CPT | Performed by: INTERNAL MEDICINE

## 2019-02-05 RX ORDER — PENICILLIN V POTASSIUM 500 MG/1
500 TABLET, FILM COATED ORAL 3 TIMES DAILY
Qty: 30 TABLET | Refills: 0 | Status: SHIPPED | OUTPATIENT
Start: 2019-02-05 | End: 2019-04-19

## 2019-03-24 ENCOUNTER — MYC REFILL (OUTPATIENT)
Dept: FAMILY MEDICINE | Facility: OTHER | Age: 34
End: 2019-03-24

## 2019-03-24 DIAGNOSIS — Z30.41 ENCOUNTER FOR SURVEILLANCE OF CONTRACEPTIVE PILLS: ICD-10-CM

## 2019-03-26 RX ORDER — DESOGESTREL AND ETHINYL ESTRADIOL 0.15-0.03
1 KIT ORAL DAILY
Qty: 28 TABLET | Refills: 0 | Status: SHIPPED | OUTPATIENT
Start: 2019-03-26 | End: 2019-04-19

## 2019-03-26 NOTE — TELEPHONE ENCOUNTER
Prescription approved per [facility].    DORON ArceN, RN  [facility]

## 2019-03-26 NOTE — TELEPHONE ENCOUNTER
"Requested Prescriptions   Pending Prescriptions Disp Refills     desogestrel-ethinyl estradiol (DESOGEN) 0.15-30 MG-MCG tablet 90 tablet 3    Last Written Prescription Date:  4/13/18  Last Fill Quantity: 90,  # refills: 3   Last office visit: 4/13/2018 with prescribing provider:  4/13/18   Future Office Visit:   Next 5 appointments (look out 90 days)    Apr 19, 2019  8:20 AM CDT  PHYSICAL with HILDA Watson CNP  [facility] [address] [facility] [id number] [phone]        Sig: Take 1 tablet by mouth daily    Contraceptives Protocol Passed - 3/25/2019 11:14 AM       Passed - Patient is not a current smoker if age is 35 or older       Passed - Recent (12 mo) or future (30 days) visit within the authorizing provider's specialty    Patient had office visit in the last 12 months or has a visit in the next 30 days with authorizing provider or within the authorizing provider's specialty.  See \"Patient Info\" tab in inbasket, or \"Choose Columns\" in Meds & Orders section of the refill encounter.             Passed - Medication is active on med list       Passed - No active pregnancy on record       Passed - No positive pregnancy test in past 12 months        "

## 2019-04-19 ENCOUNTER — OFFICE VISIT (OUTPATIENT)
Dept: FAMILY MEDICINE | Facility: OTHER | Age: 34
End: 2019-04-19
Payer: COMMERCIAL

## 2019-04-19 VITALS
HEART RATE: 110 BPM | SYSTOLIC BLOOD PRESSURE: 110 MMHG | OXYGEN SATURATION: 100 % | WEIGHT: 136.25 LBS | TEMPERATURE: 97.5 F | DIASTOLIC BLOOD PRESSURE: 62 MMHG | BODY MASS INDEX: 21.9 KG/M2 | HEIGHT: 66 IN | RESPIRATION RATE: 16 BRPM

## 2019-04-19 DIAGNOSIS — Z13.1 SCREENING FOR DIABETES MELLITUS: ICD-10-CM

## 2019-04-19 DIAGNOSIS — Z00.00 ROUTINE HISTORY AND PHYSICAL EXAMINATION OF ADULT: Primary | ICD-10-CM

## 2019-04-19 DIAGNOSIS — Z13.6 CARDIOVASCULAR SCREENING; LDL GOAL LESS THAN 160: ICD-10-CM

## 2019-04-19 DIAGNOSIS — Z30.41 ENCOUNTER FOR SURVEILLANCE OF CONTRACEPTIVE PILLS: ICD-10-CM

## 2019-04-19 LAB
CHOLEST SERPL-MCNC: 223 MG/DL
GLUCOSE SERPL-MCNC: 93 MG/DL (ref 70–99)
HDLC SERPL-MCNC: 57 MG/DL
LDLC SERPL CALC-MCNC: 143 MG/DL
NONHDLC SERPL-MCNC: 166 MG/DL
TRIGL SERPL-MCNC: 115 MG/DL

## 2019-04-19 PROCEDURE — 80061 LIPID PANEL: CPT | Performed by: NURSE PRACTITIONER

## 2019-04-19 PROCEDURE — 82947 ASSAY GLUCOSE BLOOD QUANT: CPT | Performed by: NURSE PRACTITIONER

## 2019-04-19 PROCEDURE — 99395 PREV VISIT EST AGE 18-39: CPT | Performed by: NURSE PRACTITIONER

## 2019-04-19 PROCEDURE — 36415 COLL VENOUS BLD VENIPUNCTURE: CPT | Performed by: NURSE PRACTITIONER

## 2019-04-19 RX ORDER — DESOGESTREL AND ETHINYL ESTRADIOL 0.15-0.03
1 KIT ORAL DAILY
Qty: 84 TABLET | Refills: 4 | Status: SHIPPED | OUTPATIENT
Start: 2019-04-19 | End: 2020-06-05

## 2019-04-19 ASSESSMENT — ENCOUNTER SYMPTOMS
SORE THROAT: 0
DIZZINESS: 0
PARESTHESIAS: 0
MYALGIAS: 0
FREQUENCY: 0
SHORTNESS OF BREATH: 0
JOINT SWELLING: 0
COUGH: 0
ARTHRALGIAS: 0
HEMATOCHEZIA: 0
NAUSEA: 0
PALPITATIONS: 0
HEMATURIA: 0
ABDOMINAL PAIN: 0
NERVOUS/ANXIOUS: 0
CHILLS: 0
DIARRHEA: 0
HEARTBURN: 0
HEADACHES: 0
CONSTIPATION: 0
BREAST MASS: 0
EYE PAIN: 0
DYSURIA: 0
FEVER: 0

## 2019-04-19 ASSESSMENT — PAIN SCALES - GENERAL: PAINLEVEL: NO PAIN (0)

## 2019-04-19 ASSESSMENT — MIFFLIN-ST. JEOR: SCORE: 1332.96

## 2019-04-19 NOTE — PATIENT INSTRUCTIONS
Labs will be done today.   For normal results, you will receive a letter with the results in about 2 weeks.  If anything is abnormal or unexpected, someone from the clinic will call you.        Preventive Health Recommendations  Female Ages 26 - 39  Yearly exam:   See your health care provider every year in order to    Review health changes.     Discuss preventive care.      Review your medicines if you your doctor has prescribed any.    Until age 30: Get a Pap test every three years (more often if you have had an abnormal result).    After age 30: Talk to your doctor about whether you should have a Pap test every 3 years or have a Pap test with HPV screening every 5 years.   You do not need a Pap test if your uterus was removed (hysterectomy) and you have not had cancer.  You should be tested each year for STDs (sexually transmitted diseases), if you're at risk.   Talk to your provider about how often to have your cholesterol checked.  If you are at risk for diabetes, you should have a diabetes test (fasting glucose).  Shots: Get a flu shot each year. Get a tetanus shot every 10 years.   Nutrition:     Eat at least 5 servings of fruits and vegetables each day.    Eat whole-grain bread, whole-wheat pasta and brown rice instead of white grains and rice.    Get adequate Calcium and Vitamin D.     Lifestyle    Exercise at least 150 minutes a week (30 minutes a day, 5 days of the week). This will help you control your weight and prevent disease.    Limit alcohol to one drink per day.    No smoking.     Wear sunscreen to prevent skin cancer.    See your dentist every six months for an exam and cleaning.

## 2019-04-19 NOTE — PROGRESS NOTES
SUBJECTIVE:   CC: Rosemarie Wade is an 33 year old woman who presents for preventive health visit.     Healthy Habits:     Getting at least 3 servings of Calcium per day:  Yes    Bi-annual eye exam:  NO    Dental care twice a year:  Yes    Sleep apnea or symptoms of sleep apnea:  None    Diet:  Regular (no restrictions)    Frequency of exercise:  2-3 days/week    Duration of exercise:  15-30 minutes    Taking medications regularly:  Yes    Medication side effects:  None    PHQ-2 Total Score: 0    Additional concerns today:  No        Today's PHQ-2 Score:   PHQ-2 ( 1999 [employer]) 4/19/2019   Q1: Little interest or pleasure in doing things 0   Q2: Feeling down, depressed or hopeless 0   PHQ-2 Score 0   Q1: Little interest or pleasure in doing things Not at all   Q2: Feeling down, depressed or hopeless Not at all   PHQ-2 Score 0       Abuse: Current or Past(Physical, Sexual or Emotional)- No  Do you feel safe in your environment? Yes    Social History     Tobacco Use     Smoking status: Never Smoker     Smokeless tobacco: Never Used   Substance Use Topics     Alcohol use: No         Alcohol Use 4/19/2019   Prescreen: >3 drinks/day or >7 drinks/week? Not Applicable   Prescreen: >3 drinks/day or >7 drinks/week? -   No flowsheet data found.    Reviewed orders with patient.  Reviewed health maintenance and updated orders accordingly - Yes  Labs reviewed in EPIC  BP Readings from Last 3 Encounters:   04/19/19 110/62   02/05/19 104/70   06/27/18 105/71    Wt Readings from Last 3 Encounters:   04/19/19 61.8 kg (136 lb 4 oz)   04/13/18 68.9 kg (152 lb)   06/20/17 73.5 kg (162 lb 1.6 oz)                  Patient Active Problem List   Diagnosis     Contraception     CARDIOVASCULAR SCREENING; LDL GOAL LESS THAN 160     Hyperlipidemia LDL goal <160     Past Surgical History:   Procedure Laterality Date     HC TOOTH EXTRACTION W/FORCEP         Social History     Tobacco Use     Smoking status: Never Smoker     Smokeless  tobacco: Never Used   Substance Use Topics     Alcohol use: No     Family History   Problem Relation Age of Onset     Diabetes Maternal Grandmother      Cancer Maternal Grandmother         ovarian CA     Hypertension Paternal Grandmother      Cerebrovascular Disease Paternal Grandmother      Hypertension Paternal Grandfather      Heart Disease Paternal Grandfather         MI at age 56     Hypertension Brother          Allergies   Allergen Reactions     No Known Drug Allergies        Mammogram not appropriate for this patient based on age.    Pertinent mammograms are reviewed under the imaging tab.  History of abnormal Pap smear: NO - age 30-65 PAP every 5 years with negative HPV co-testing recommended  PAP / HPV Latest Ref Rng & Units 3/8/2017 8/23/2013 7/28/2011   PAP - NIL NIL NIL   HPV 16 DNA NEG Negative - -   HPV 18 DNA NEG Negative - -   OTHER HR HPV NEG Negative - -     Reviewed and updated as needed this visit by clinical staff  Tobacco  Allergies  Meds         Reviewed and updated as needed this visit by Provider        Past Medical History:   Diagnosis Date     NO ACTIVE PROBLEMS       Past Surgical History:   Procedure Laterality Date     HC TOOTH EXTRACTION W/FORCEP         Review of Systems   Constitutional: Negative for chills and fever.   HENT: Negative for congestion, ear pain, hearing loss and sore throat.    Eyes: Negative for pain and visual disturbance.   Respiratory: Negative for cough and shortness of breath.    Cardiovascular: Negative for chest pain, palpitations and peripheral edema.   Gastrointestinal: Negative for abdominal pain, constipation, diarrhea, heartburn, hematochezia and nausea.   Breasts:  Negative for tenderness, breast mass and discharge.   Genitourinary: Negative for dysuria, frequency, genital sores, hematuria, pelvic pain, urgency, vaginal bleeding and vaginal discharge.   Musculoskeletal: Negative for arthralgias, joint swelling and myalgias.   Skin: Negative for rash.  "  Neurological: Negative for dizziness, headaches and paresthesias.   Psychiatric/Behavioral: Negative for mood changes. The patient is not nervous/anxious.         OBJECTIVE:   /62 (BP Location: Left arm, Patient Position: Sitting, Cuff Size: Adult Regular)   Pulse 110   Temp 97.5  F (36.4  C) (Temporal)   Resp 16   Ht 1.681 m (5' 6.2\")   Wt 61.8 kg (136 lb 4 oz)   LMP 03/25/2019   SpO2 100%   Breastfeeding? No   BMI 21.86 kg/m    Physical Exam  GENERAL: healthy, alert and no distress  EYES: Eyes grossly normal to inspection, PERRL and conjunctivae and sclerae normal  HENT: ear canals and TM's normal, nose and mouth without ulcers or lesions  NECK: no adenopathy, no asymmetry, masses, or scars and thyroid normal to palpation  RESP: lungs clear to auscultation - no rales, rhonchi or wheezes  BREAST: normal without masses, tenderness or nipple discharge and no palpable axillary masses or adenopathy  CV: regular rate and rhythm, normal S1 S2, no S3 or S4, no murmur, click or rub, no peripheral edema and peripheral pulses strong  ABDOMEN: soft, nontender, no hepatosplenomegaly, no masses and bowel sounds normal  MS: no gross musculoskeletal defects noted, no edema  SKIN: no suspicious lesions or rashes  NEURO: Normal strength and tone, mentation intact and speech normal  PSYCH: mentation appears normal, affect normal/bright    Diagnostic Test Results:  Pending     ASSESSMENT/PLAN:   1. Routine history and physical examination of adult    2. Encounter for surveillance of contraceptive pills  - desogestrel-ethinyl estradiol (DESOGEN) 0.15-30 MG-MCG tablet; Take 1 tablet by mouth daily  Dispense: 84 tablet; Refill: 4    3. Screening for diabetes mellitus  - Glucose    4. CARDIOVASCULAR SCREENING; LDL GOAL LESS THAN 160  - Lipid panel reflex to direct LDL Fasting    COUNSELING:  Reviewed preventive health counseling, as reflected in patient instructions    BP Readings from Last 1 Encounters:   04/19/19 " "110/62     Estimated body mass index is 21.86 kg/m  as calculated from the following:    Height as of this encounter: 1.681 m (5' 6.2\").    Weight as of this encounter: 61.8 kg (136 lb 4 oz).           reports that she has never smoked. She has never used smokeless tobacco.      Counseling Resources:  ATP IV Guidelines  Pooled Cohorts Equation Calculator  Breast Cancer Risk Calculator  FRAX Risk Assessment  ICSI Preventive Guidelines  Dietary Guidelines for Americans, 2010  USDA's MyPlate  ASA Prophylaxis  Lung CA Screening    HILDA Watson [facility]  "

## 2019-04-19 NOTE — LETTER
April 19, 2019      Rosemarie Wade  [address] [address]        Dear ,    We are writing to inform you of your test results.    1.  Your glucose was normal, no sign of diabetes.   2.  Your cholesterol levels came back higher than we would like them to be.  You do not need medications at this time, but I would like you to work on improving your diet by eating more vegetables, lean protein and fiber and less saturated fats and carbohydrates (pasta, bread, rice, potatoes and sugar).   You should also exercise at least 150 minutes a week.  We will recheck this again next year.     Resulted Orders   Glucose   Result Value Ref Range    Glucose 93 70 - 99 mg/dL   Lipid panel reflex to direct LDL Fasting   Result Value Ref Range    Cholesterol 223 (H) <200 mg/dL      Comment:      Desirable:       <200 mg/dl    Triglycerides 115 <150 mg/dL    HDL Cholesterol 57 >49 mg/dL    LDL Cholesterol Calculated 143 (H) <100 mg/dL      Comment:      Above desirable:  100-129 mg/dl  Borderline High:  130-159 mg/dL  High:             160-189 mg/dL  Very high:       >189 mg/dl      Non HDL Cholesterol 166 (H) <130 mg/dL      Comment:      Above Desirable:  130-159 mg/dl  Borderline high:  160-189 mg/dl  High:             190-219 mg/dl  Very high:       >219 mg/dl         If you have any questions or concerns, please call the clinic at the number listed above.       Sincerely,        HILDA Watson CNP

## 2019-07-25 ENCOUNTER — OFFICE VISIT (OUTPATIENT)
Dept: URGENT CARE | Facility: RETAIL CLINIC | Age: 34
End: 2019-07-25
Payer: COMMERCIAL

## 2019-07-25 VITALS
SYSTOLIC BLOOD PRESSURE: 104 MMHG | DIASTOLIC BLOOD PRESSURE: 71 MMHG | OXYGEN SATURATION: 100 % | HEART RATE: 77 BPM | TEMPERATURE: 98.5 F

## 2019-07-25 DIAGNOSIS — J02.9 ACUTE PHARYNGITIS, UNSPECIFIED ETIOLOGY: Primary | ICD-10-CM

## 2019-07-25 LAB — S PYO AG THROAT QL IA.RAPID: NORMAL

## 2019-07-25 PROCEDURE — 99213 OFFICE O/P EST LOW 20 MIN: CPT | Performed by: FAMILY MEDICINE

## 2019-07-25 PROCEDURE — 87880 STREP A ASSAY W/OPTIC: CPT | Mod: QW | Performed by: FAMILY MEDICINE

## 2019-07-25 PROCEDURE — 87081 CULTURE SCREEN ONLY: CPT | Performed by: FAMILY MEDICINE

## 2019-07-25 NOTE — PROGRESS NOTES
SUBJECTIVE:  Rosemarie Wade is a 33 year old female with a chief complaint of sore throat.  Onset of symptoms was 1 day ago.    Course of illness: gradual onset and still present.  Severity mild  Current and Associated symptoms: fatigue  Treatment measures tried include None tried.  Predisposing factors include None.    Past Medical History:   Diagnosis Date     NO ACTIVE PROBLEMS      Current Outpatient Medications   Medication Sig Dispense Refill     desogestrel-ethinyl estradiol (DESOGEN) 0.15-30 MG-MCG tablet Take 1 tablet by mouth daily 84 tablet 4     History   Smoking Status     Never Smoker   Smokeless Tobacco     Never Used       ROS:  Review of systems negative except as stated above.    OBJECTIVE:   /71   Pulse 77   Temp 98.5  F (36.9  C) (Oral)   SpO2 100%   GENERAL APPEARANCE: healthy, alert and no distress  EYES: EOMI,  PERRL, conjunctiva clear  HENT: ear canals and TM's normal.  Nose normal.  Pharynx erythematous with some exudate noted.  NECK: supple, non-tender to palpation, no adenopathy noted  RESP: lungs clear to auscultation - no rales, rhonchi or wheezes  CV: regular rates and rhythm, normal S1 S2, no murmur noted  ABDOMEN:  soft, nontender, no HSM or masses and bowel sounds normal  SKIN: no suspicious lesions or rashes    Rapid Strep test is negative; await throat culture results.    ASSESSMENT:  Acute pharyngitis, unspecified etiology    PLAN:   Symptomatic treat with gargles, lozenges, and OTC analgesic as needed.   Follow-up with primary care provider if not improving.

## 2019-07-27 LAB
BACTERIA SPEC CULT: NORMAL
SPECIMEN SOURCE: NORMAL

## 2019-09-28 ENCOUNTER — HEALTH MAINTENANCE LETTER (OUTPATIENT)
Age: 34
End: 2019-09-28

## 2020-03-24 ENCOUNTER — NURSE TRIAGE (OUTPATIENT)
Dept: FAMILY MEDICINE | Facility: OTHER | Age: 35
End: 2020-03-24

## 2020-03-24 ENCOUNTER — HOSPITAL ENCOUNTER (EMERGENCY)
Facility: CLINIC | Age: 35
Discharge: HOME | End: 2020-03-24
Attending: FAMILY MEDICINE | Admitting: FAMILY MEDICINE
Payer: COMMERCIAL

## 2020-03-24 ENCOUNTER — APPOINTMENT (OUTPATIENT)
Dept: ULTRASOUND IMAGING | Facility: CLINIC | Age: 35
End: 2020-03-24
Attending: FAMILY MEDICINE
Payer: COMMERCIAL

## 2020-03-24 VITALS
RESPIRATION RATE: 14 BRPM | OXYGEN SATURATION: 100 % | TEMPERATURE: 97.8 F | DIASTOLIC BLOOD PRESSURE: 82 MMHG | SYSTOLIC BLOOD PRESSURE: 132 MMHG

## 2020-03-24 DIAGNOSIS — R10.11 ABDOMINAL PAIN, RIGHT UPPER QUADRANT: ICD-10-CM

## 2020-03-24 DIAGNOSIS — K80.50 BILIARY COLIC: ICD-10-CM

## 2020-03-24 LAB
ALBUMIN SERPL-MCNC: 3.8 G/DL (ref 3.4–5)
ALBUMIN UR-MCNC: NEGATIVE MG/DL
ALP SERPL-CCNC: 51 U/L (ref 40–150)
ALT SERPL W P-5'-P-CCNC: 19 U/L (ref 0–50)
ANION GAP SERPL CALCULATED.3IONS-SCNC: 8 MMOL/L (ref 3–14)
APPEARANCE UR: ABNORMAL
AST SERPL W P-5'-P-CCNC: 13 U/L (ref 0–45)
BASOPHILS # BLD AUTO: 0 10E9/L (ref 0–0.2)
BASOPHILS NFR BLD AUTO: 0.2 %
BILIRUB SERPL-MCNC: 0.5 MG/DL (ref 0.2–1.3)
BILIRUB UR QL STRIP: NEGATIVE
BUN SERPL-MCNC: 13 MG/DL (ref 7–30)
CALCIUM SERPL-MCNC: 9.2 MG/DL (ref 8.5–10.1)
CHLORIDE SERPL-SCNC: 105 MMOL/L (ref 94–109)
CO2 SERPL-SCNC: 23 MMOL/L (ref 20–32)
COLOR UR AUTO: YELLOW
CREAT SERPL-MCNC: 0.7 MG/DL (ref 0.52–1.04)
D DIMER PPP FEU-MCNC: 0.3 UG/ML FEU (ref 0–0.5)
DIFFERENTIAL METHOD BLD: NORMAL
EOSINOPHIL NFR BLD AUTO: 0.1 %
ERYTHROCYTE [DISTWIDTH] IN BLOOD BY AUTOMATED COUNT: 12.1 % (ref 10–15)
GFR SERPL CREATININE-BSD FRML MDRD: >90 ML/MIN/{1.73_M2}
GLUCOSE SERPL-MCNC: 108 MG/DL (ref 70–99)
GLUCOSE UR STRIP-MCNC: NEGATIVE MG/DL
HCG UR QL: NEGATIVE
HCT VFR BLD AUTO: 40.6 % (ref 35–47)
HGB BLD-MCNC: 13.5 G/DL (ref 11.7–15.7)
HGB UR QL STRIP: NEGATIVE
IMM GRANULOCYTES # BLD: 0 10E9/L (ref 0–0.4)
IMM GRANULOCYTES NFR BLD: 0.4 %
KETONES UR STRIP-MCNC: 20 MG/DL
LEUKOCYTE ESTERASE UR QL STRIP: NEGATIVE
LIPASE SERPL-CCNC: 104 U/L (ref 73–393)
LYMPHOCYTES # BLD AUTO: 1.2 10E9/L (ref 0.8–5.3)
LYMPHOCYTES NFR BLD AUTO: 13.6 %
MCH RBC QN AUTO: 30.4 PG (ref 26.5–33)
MCHC RBC AUTO-ENTMCNC: 33.3 G/DL (ref 31.5–36.5)
MCV RBC AUTO: 91 FL (ref 78–100)
MONOCYTES # BLD AUTO: 0.3 10E9/L (ref 0–1.3)
MONOCYTES NFR BLD AUTO: 3.5 %
MUCOUS THREADS #/AREA URNS LPF: PRESENT /LPF
NEUTROPHILS # BLD AUTO: 7 10E9/L (ref 1.6–8.3)
NEUTROPHILS NFR BLD AUTO: 82.2 %
NITRATE UR QL: NEGATIVE
NRBC # BLD AUTO: 0 10*3/UL
NRBC BLD AUTO-RTO: 0 /100
PH UR STRIP: 6 PH (ref 5–7)
PLATELET # BLD AUTO: 271 10E9/L (ref 150–450)
POTASSIUM SERPL-SCNC: 3.8 MMOL/L (ref 3.4–5.3)
PROT SERPL-MCNC: 7.4 G/DL (ref 6.8–8.8)
RBC # BLD AUTO: 4.44 10E12/L (ref 3.8–5.2)
RBC #/AREA URNS AUTO: 2 /HPF (ref 0–2)
SODIUM SERPL-SCNC: 136 MMOL/L (ref 133–144)
SOURCE: ABNORMAL
SP GR UR STRIP: 1.02 (ref 1–1.03)
SQUAMOUS #/AREA URNS AUTO: 4 /HPF (ref 0–1)
UROBILINOGEN UR STRIP-MCNC: 0 MG/DL (ref 0–2)
WBC # BLD AUTO: 8.5 10E9/L (ref 4–11)
WBC #/AREA URNS AUTO: 1 /HPF (ref 0–5)

## 2020-03-24 PROCEDURE — 96375 TX/PRO/DX INJ NEW DRUG ADDON: CPT | Performed by: FAMILY MEDICINE

## 2020-03-24 PROCEDURE — 81001 URINALYSIS AUTO W/SCOPE: CPT | Performed by: FAMILY MEDICINE

## 2020-03-24 PROCEDURE — 85025 COMPLETE CBC W/AUTO DIFF WBC: CPT | Performed by: FAMILY MEDICINE

## 2020-03-24 PROCEDURE — 99284 EMERGENCY DEPT VISIT MOD MDM: CPT | Mod: Z6 | Performed by: FAMILY MEDICINE

## 2020-03-24 PROCEDURE — 83690 ASSAY OF LIPASE: CPT | Performed by: FAMILY MEDICINE

## 2020-03-24 PROCEDURE — 25000128 H RX IP 250 OP 636: Performed by: FAMILY MEDICINE

## 2020-03-24 PROCEDURE — 81025 URINE PREGNANCY TEST: CPT | Performed by: FAMILY MEDICINE

## 2020-03-24 PROCEDURE — 96374 THER/PROPH/DIAG INJ IV PUSH: CPT | Performed by: FAMILY MEDICINE

## 2020-03-24 PROCEDURE — 76705 ECHO EXAM OF ABDOMEN: CPT

## 2020-03-24 PROCEDURE — 85379 FIBRIN DEGRADATION QUANT: CPT | Performed by: FAMILY MEDICINE

## 2020-03-24 PROCEDURE — 99285 EMERGENCY DEPT VISIT HI MDM: CPT | Mod: 25 | Performed by: FAMILY MEDICINE

## 2020-03-24 PROCEDURE — 80053 COMPREHEN METABOLIC PANEL: CPT | Performed by: FAMILY MEDICINE

## 2020-03-24 RX ORDER — ONDANSETRON 2 MG/ML
4 INJECTION INTRAMUSCULAR; INTRAVENOUS ONCE
Status: COMPLETED | OUTPATIENT
Start: 2020-03-24 | End: 2020-03-24

## 2020-03-24 RX ORDER — ONDANSETRON 4 MG/1
4 TABLET, ORALLY DISINTEGRATING ORAL EVERY 6 HOURS PRN
Qty: 10 TABLET | Refills: 0 | Status: SHIPPED | OUTPATIENT
Start: 2020-03-24 | End: 2020-10-09

## 2020-03-24 RX ORDER — KETOROLAC TROMETHAMINE 30 MG/ML
30 INJECTION, SOLUTION INTRAMUSCULAR; INTRAVENOUS ONCE
Status: COMPLETED | OUTPATIENT
Start: 2020-03-24 | End: 2020-03-24

## 2020-03-24 RX ORDER — OXYCODONE HYDROCHLORIDE 5 MG/1
2.5-5 TABLET ORAL EVERY 6 HOURS PRN
Qty: 12 TABLET | Refills: 0 | Status: SHIPPED | OUTPATIENT
Start: 2020-03-24 | End: 2020-10-09

## 2020-03-24 RX ADMIN — KETOROLAC TROMETHAMINE 30 MG: 30 INJECTION, SOLUTION INTRAMUSCULAR; INTRAVENOUS at 11:09

## 2020-03-24 RX ADMIN — ONDANSETRON 4 MG: 2 INJECTION INTRAMUSCULAR; INTRAVENOUS at 11:04

## 2020-03-24 NOTE — TELEPHONE ENCOUNTER
"    Reason for Disposition    SEVERE chest pain    Answer Assessment - Initial Assessment Questions  1. LOCATION: \"Where does it hurt?\"        Right side-under rib cage  2. RADIATION: \"Does the pain go anywhere else?\" (e.g., into neck, jaw, arms, back)      no  3. ONSET: \"When did the chest pain begin?\" (Minutes, hours or days)       Started last night  4. PATTERN \"Does the pain come and go, or has it been constant since it started?\"  \"Does it get worse with exertion?\"       constatn  5. DURATION: \"How long does it last\" (e.g., seconds, minutes, hours)      constant  6. SEVERITY: \"How bad is the pain?\"  (e.g., Scale 1-10; mild, moderate, or severe)     - MILD (1-3): doesn't interfere with normal activities      - MODERATE (4-7): interferes with normal activities or awakens from sleep     - SEVERE (8-10): excruciating pain, unable to do any normal activities        8/10  7. CARDIAC RISK FACTORS: \"Do you have any history of heart problems or risk factors for heart disease?\" (e.g., prior heart attack, angina; high blood pressure, diabetes, being overweight, high cholesterol, smoking, or strong family history of heart disease)      Not asked  8. PULMONARY RISK FACTORS: \"Do you have any history of lung disease?\"  (e.g., blood clots in lung, asthma, emphysema, birth control pills)      Not asked  9. CAUSE: \"What do you think is causing the chest pain?\"      Family history of gallbladder issues  10. OTHER SYMPTOMS: \"Do you have any other symptoms?\" (e.g., dizziness, nausea, vomiting, sweating, fever, difficulty breathing, cough)        Dry heaves  11. PREGNANCY: \"Is there any chance you are pregnant?\" \"When was your last menstrual period?\"        Not asked.    Protocols used: CHEST PAIN-A-[place]      "

## 2020-03-24 NOTE — ED TRIAGE NOTES
Presents with mid epigastric pain that woke her during the night. States it radiates into the back and caused her to throw up,

## 2020-03-24 NOTE — ED PROVIDER NOTES
[facility] ED Provider Note   Patient: Rosemarie Wade  MRN #:  [id number]  Date of Visit: March 24, 2020    CC:     Chief Complaint   Patient presents with     Abdominal Pain     HPI:  Rosemarie Wade is a 34 year old female who presented to the emergency department with acute onset of right upper quadrant abdominal pain that started around midnight.  Pain is sharp and stabbing and radiates into the right flank.  There has been nausea and an episode of vomiting this morning.  Pain is rated 7-8/10 since onset.  She has not had any previous episodes of this pain, has no prior abdominal surgeries, and there has been no associated exacerbating or alleviating factors.  She denies any fever, chills, sore throat, cough, shortness of breath, anterior chest pain, changes in bowel habits, or urinary symptoms.  She is on oral contraceptives but does not smoke.  She does not have any prior history of DVT or PE and has not had any recent calf pain or ankle swelling.  Patient denies any tobacco, alcohol or illicit drug use.  She thinks that she may have mild irritable bowel syndrome, and recently had slightly loose stools.  She had a normal bowel movement 2 hours ago.    Problem List:  Patient Active Problem List    Diagnosis Date Noted     Hyperlipidemia LDL goal <160 09/11/2013     Priority: Medium     CARDIOVASCULAR SCREENING; LDL GOAL LESS THAN 160 07/28/2011     Priority: Medium     Contraception 12/08/2009     Priority: Medium       Past Medical History:   Diagnosis Date     NO ACTIVE PROBLEMS        MEDS: ondansetron (ZOFRAN ODT) 4 MG ODT tab  oxyCODONE (ROXICODONE) 5 MG tablet  desogestrel-ethinyl estradiol (DESOGEN) 0.15-30 MG-MCG tablet        ALLERGIES:    Allergies   Allergen Reactions     No Known Drug Allergies        Past Surgical History:   Procedure Laterality Date     HC TOOTH EXTRACTION W/FORCEP         Social History     Tobacco Use      Smoking status: Never Smoker     Smokeless tobacco: Never Used   Substance Use Topics     Alcohol use: No     Drug use: No         Review of Systems   Except as noted in HPI, all other systems were reviewed and are negative    Physical Exam     Vitals were reviewed  Patient Vitals for the past 8 hrs:   BP Temp Heart Rate Resp SpO2   03/24/20 1017 132/82 97.8  F (36.6  C) 80 14 100 %     GENERAL APPEARANCE: Alert and oriented x3, mild to moderate distress  FACE: normal facies  EYES: Pupils are equal; sclerae nonicteric  HENT: normal external exam  NECK: no adenopathy or asymmetry  RESP: normal respiratory effort; clear breath sounds bilaterally  CV: regular rate and rhythm; no significant murmurs, gallops or rubs  ABD: soft, right upper quadrant abdominal tenderness; no rebound or guarding; bowel sounds are normal  MS: no gross deformities noted; normal muscle tone.  EXT: No calf tenderness or pitting edema  SKIN: no worrisome rash  NEURO: no facial droop; no focal deficits, speech is normal  PSYCH: normal mood and affect      Available Lab/Imaging Results     Results for orders placed or performed during the hospital encounter of 03/24/20 (from the past 24 hour(s))   CBC with platelets differential   Result Value Ref Range    WBC 8.5 4.0 - 11.0 10e9/L    RBC Count 4.44 3.8 - 5.2 10e12/L    Hemoglobin 13.5 11.7 - 15.7 g/dL    Hematocrit 40.6 35.0 - 47.0 %    MCV 91 78 - 100 fl    MCH 30.4 26.5 - 33.0 pg    MCHC 33.3 31.5 - 36.5 g/dL    RDW 12.1 10.0 - 15.0 %    Platelet Count 271 150 - 450 10e9/L    Diff Method Automated Method     % Neutrophils 82.2 %    % Lymphocytes 13.6 %    % Monocytes 3.5 %    % Eosinophils 0.1 %    % Basophils 0.2 %    % Immature Granulocytes 0.4 %    Nucleated RBCs 0 0 /100    Absolute Neutrophil 7.0 1.6 - 8.3 10e9/L    Absolute Lymphocytes 1.2 0.8 - 5.3 10e9/L    Absolute Monocytes 0.3 0.0 - 1.3 10e9/L    Absolute Basophils 0.0 0.0 - 0.2 10e9/L    Abs Immature Granulocytes 0.0 0 - 0.4 10e9/L     Absolute Nucleated RBC 0.0    Comprehensive metabolic panel   Result Value Ref Range    Sodium 136 133 - 144 mmol/L    Potassium 3.8 3.4 - 5.3 mmol/L    Chloride 105 94 - 109 mmol/L    Carbon Dioxide 23 20 - 32 mmol/L    Anion Gap 8 3 - 14 mmol/L    Glucose 108 (H) 70 - 99 mg/dL    Urea Nitrogen 13 7 - 30 mg/dL    Creatinine 0.70 0.52 - 1.04 mg/dL    GFR Estimate >90 >60 mL/min/[1.73_m2]    GFR Estimate If Black >90 >60 mL/min/[1.73_m2]    Calcium 9.2 8.5 - 10.1 mg/dL    Bilirubin Total 0.5 0.2 - 1.3 mg/dL    Albumin 3.8 3.4 - 5.0 g/dL    Protein Total 7.4 6.8 - 8.8 g/dL    Alkaline Phosphatase 51 40 - 150 U/L    ALT 19 0 - 50 U/L    AST 13 0 - 45 U/L   Lipase   Result Value Ref Range    Lipase 104 73 - 393 U/L   D dimer quantitative   Result Value Ref Range    D Dimer 0.3 0.0 - 0.50 ug/ml FEU   UA reflex to Microscopic   Result Value Ref Range    Color Urine Yellow     Appearance Urine Slightly Cloudy     Glucose Urine Negative NEG^Negative mg/dL    Bilirubin Urine Negative NEG^Negative    Ketones Urine 20 (A) NEG^Negative mg/dL    Specific Gravity Urine 1.024 1.003 - 1.035    Blood Urine Negative NEG^Negative    pH Urine 6.0 5.0 - 7.0 pH    Protein Albumin Urine Negative NEG^Negative mg/dL    Urobilinogen mg/dL 0.0 0.0 - 2.0 mg/dL    Nitrite Urine Negative NEG^Negative    Leukocyte Esterase Urine Negative NEG^Negative    Source Midstream Urine     RBC Urine 2 0 - 2 /HPF    WBC Urine 1 0 - 5 /HPF    Squamous Epithelial /HPF Urine 4 (H) 0 - 1 /HPF    Mucous Urine Present (A) NEG^Negative /LPF   HCG qualitative urine (UPT)   Result Value Ref Range    HCG Qual Urine Negative NEG^Negative   US Abdomen Limited    Narrative    ULTRASOUND ABDOMEN LIMITED March 24, 2020 11:50 AM    CLINICAL HISTORY: Right upper quadrant abdominal pain. Right upper  quadrant abdominal pain since midnight.    TECHNIQUE: Limited abdominal ultrasound.    COMPARISON: None.    FINDINGS:    GALLBLADDER: Multiple gallstones layering in the  gallbladder lumen.  Negative sonographic Peña's sign. Gallbladder wall is 3 mm. Mildly  distended gallbladder.    BILE DUCTS: There is no biliary dilatation. The common duct measures 4  mm.    LIVER: Normal where seen.    RIGHT KIDNEY: No hydronephrosis.    PANCREAS: The visualized portions of the pancreas are normal.    No ascites.      Impression    IMPRESSION:  1.  Cholelithiasis. Mild gallbladder distention. Minimal borderline  prominence of the gallbladder wall. Negative sonographic Peña's  sign. Cholecystitis not entirely excluded.  2.  No biliary dilatation.              Impression     Final diagnoses:   Abdominal pain, right upper quadrant   Biliary colic         ED Course & Medical Decision Making   Rosemarie Wade is a 34 year old female who presented to the emergency department with acute onset of right upper quadrant abdominal pain that started around midnight.  Pain is described as sharp and stabbing with pain intensity of 7-8/10.  Patient has never had this type of pain before and there has been no exacerbating or alleviating factors.  Patient was seen shortly after arrival.  Vital signs reveal a temp of 97.8, blood pressure 132/82, heart rate of 80, respiratory rate of 14 with 100% oxygen saturation.  Patient reported a family history of gallbladder disease in her mother but no other prevalence of disease.  Exam reveals localized right upper quadrant tenderness with increased pain upon exhalation.  Work-up reveals a white blood count of 8.5 with hemoglobin of 13.5.  Platelet counts 271 and there is 82% neutrophils.  Comprehensive metabolic panel is normal except for a nonfasting glucose of 108.  She has normal total bilirubin and liver enzymes.  Lipase is 104, d-dimer 0.3.  Urinalysis reveals 20 of ketones but no significant red or white blood cells.  Right upper quadrant ultrasound reveals multiple gallstones layering in the gallbladder lumen.  Negative sonographic Peña sign.  There is  minimal borderline prominence of the gallbladder wall.  There is no biliary dilatation.  Patient received IV Zofran and Toradol with improvement of her pain level from 7 down to 4.  She does not have any obstructive signs and no dilatation of the common bile duct.  Patient was instructed to avoid fatty or greasy foods.  Continue with ibuprofen as needed and reserve oxycodone for breakthrough pain.  Follow-up with general surgery within the next week.  Return to the ED if pain becomes unrelenting or is associated with fever.  Patient was in agreement with this plan and is improved and stable for discharge home.         Written after-visit summary and instructions were given at the time of discharge.    Follow up Plan:   General surgeon    Schedule an appointment as soon as possible for a visit         Discharge Medications: Zofran, oxycodone         Disclaimer: This note consists of words and symbols derived from keyboarding and dictation using voice recognition software.  As a result, there may be errors that have gone undetected.  Please consider this when interpreting information found in this note.       Scott Sanabria MD  03/24/20 1211

## 2020-03-24 NOTE — DISCHARGE INSTRUCTIONS
You have gallbladder disease with gallstones visible on the ultrasound.  There is mild gallbladder wall distention, but no signs of obstruction.  You are having signs of biliary colic which is the pain associated with cramping of the gallbladder.  Take ibuprofen up to 400 mg every 6 hours as needed for pain.  Reserve oxycodone 1/2 to 1 tablet every 6 hours as needed for severe pain.  Avoid fatty and greasy foods.  Follow-up with general surgery within the next week to establish care and to obtain a consultation.  Return to the emergency department if your pain is unrelenting or you have associated fever.  
None

## 2020-08-29 ENCOUNTER — MYC REFILL (OUTPATIENT)
Dept: FAMILY MEDICINE | Facility: OTHER | Age: 35
End: 2020-08-29

## 2020-08-29 DIAGNOSIS — Z30.41 ENCOUNTER FOR SURVEILLANCE OF CONTRACEPTIVE PILLS: ICD-10-CM

## 2020-08-31 RX ORDER — DESOGESTREL AND ETHINYL ESTRADIOL 0.15-0.03
1 KIT ORAL DAILY
Qty: 28 TABLET | Refills: 0 | Status: SHIPPED | OUTPATIENT
Start: 2020-08-31 | End: 2020-10-09

## 2020-08-31 NOTE — TELEPHONE ENCOUNTER
Routing refill request to provider for review/approval because:  Shelley given x1 and patient did not follow up, please advise  Patient needs to be seen because it has been more than 1 year since last office visit.    DORON ArceN, RN  [facility]

## 2020-10-09 ENCOUNTER — OFFICE VISIT (OUTPATIENT)
Dept: FAMILY MEDICINE | Facility: CLINIC | Age: 35
End: 2020-10-09
Payer: COMMERCIAL

## 2020-10-09 VITALS
HEART RATE: 87 BPM | BODY MASS INDEX: 22.98 KG/M2 | SYSTOLIC BLOOD PRESSURE: 122 MMHG | HEIGHT: 67 IN | TEMPERATURE: 97.7 F | DIASTOLIC BLOOD PRESSURE: 60 MMHG | WEIGHT: 146.4 LBS | RESPIRATION RATE: 12 BRPM | OXYGEN SATURATION: 100 %

## 2020-10-09 DIAGNOSIS — Z23 ENCOUNTER FOR IMMUNIZATION: ICD-10-CM

## 2020-10-09 DIAGNOSIS — Z00.00 ROUTINE GENERAL MEDICAL EXAMINATION AT A HEALTH CARE FACILITY: Primary | ICD-10-CM

## 2020-10-09 DIAGNOSIS — K80.20 CALCULUS OF GALLBLADDER WITHOUT CHOLECYSTITIS WITHOUT OBSTRUCTION: ICD-10-CM

## 2020-10-09 DIAGNOSIS — Z30.41 ENCOUNTER FOR SURVEILLANCE OF CONTRACEPTIVE PILLS: ICD-10-CM

## 2020-10-09 PROCEDURE — 90632 HEPA VACCINE ADULT IM: CPT | Performed by: FAMILY MEDICINE

## 2020-10-09 PROCEDURE — 90471 IMMUNIZATION ADMIN: CPT | Performed by: FAMILY MEDICINE

## 2020-10-09 PROCEDURE — 99395 PREV VISIT EST AGE 18-39: CPT | Mod: 25 | Performed by: FAMILY MEDICINE

## 2020-10-09 RX ORDER — DESOGESTREL AND ETHINYL ESTRADIOL 0.15-0.03
1 KIT ORAL DAILY
Qty: 84 TABLET | Refills: 3 | Status: SHIPPED | OUTPATIENT
Start: 2020-10-09 | End: 2021-09-07

## 2020-10-09 SDOH — ECONOMIC STABILITY: FOOD INSECURITY: WITHIN THE PAST 12 MONTHS, THE FOOD YOU BOUGHT JUST DIDN'T LAST AND YOU DIDN'T HAVE MONEY TO GET MORE.: NOT ASKED

## 2020-10-09 SDOH — ECONOMIC STABILITY: INCOME INSECURITY: HOW HARD IS IT FOR YOU TO PAY FOR THE VERY BASICS LIKE FOOD, HOUSING, MEDICAL CARE, AND HEATING?: NOT ASKED

## 2020-10-09 SDOH — ECONOMIC STABILITY: TRANSPORTATION INSECURITY
IN THE PAST 12 MONTHS, HAS THE LACK OF TRANSPORTATION KEPT YOU FROM MEDICAL APPOINTMENTS OR FROM GETTING MEDICATIONS?: NOT ASKED

## 2020-10-09 SDOH — ECONOMIC STABILITY: TRANSPORTATION INSECURITY
IN THE PAST 12 MONTHS, HAS LACK OF TRANSPORTATION KEPT YOU FROM MEETINGS, WORK, OR FROM GETTING THINGS NEEDED FOR DAILY LIVING?: NOT ASKED

## 2020-10-09 SDOH — ECONOMIC STABILITY: FOOD INSECURITY: WITHIN THE PAST 12 MONTHS, YOU WORRIED THAT YOUR FOOD WOULD RUN OUT BEFORE YOU GOT MONEY TO BUY MORE.: NOT ASKED

## 2020-10-09 ASSESSMENT — ENCOUNTER SYMPTOMS
HEMATURIA: 0
DYSURIA: 0
PALPITATIONS: 0
DIZZINESS: 0
PARESTHESIAS: 0
SHORTNESS OF BREATH: 0
CONSTIPATION: 0
WEAKNESS: 0
CHILLS: 0
EYE PAIN: 0
MYALGIAS: 0
NAUSEA: 0
SORE THROAT: 0
JOINT SWELLING: 0
HEADACHES: 0
FEVER: 0
NERVOUS/ANXIOUS: 0
HEARTBURN: 0
ARTHRALGIAS: 0
BREAST MASS: 0
HEMATOCHEZIA: 0
FREQUENCY: 0
DIARRHEA: 0
ABDOMINAL PAIN: 0
COUGH: 0

## 2020-10-09 ASSESSMENT — MIFFLIN-ST. JEOR: SCORE: 1379.31

## 2020-10-09 NOTE — PROGRESS NOTES
SUBJECTIVE:   CC: Rosemarie Wade is an 34 year old woman who presents for preventive health visit.     Patient has been advised of split billing requirements and indicates understanding: Yes  Healthy Habits:     Getting at least 3 servings of Calcium per day:  Yes    Bi-annual eye exam:  NO    Dental care twice a year:  Yes    Sleep apnea or symptoms of sleep apnea:  None    Diet:  Low fat/cholesterol    Frequency of exercise:  4-5 days/week    Duration of exercise:  30-45 minutes    Taking medications regularly:  Yes    Medication side effects:  None    PHQ-2 Total Score: 0    Additional concerns today:  Yes      Patient declines flu shot. Patient would like to discuss possible gallbladder surgery, patient has forms to fill out.     Today's PHQ-2 Score:   PHQ-2 ( 1999 Pf[employer]) 10/9/2020   Q1: Little interest or pleasure in doing things 0   Q2: Feeling down, depressed or hopeless 0   PHQ-2 Score 0   Q1: Little interest or pleasure in doing things Not at all   Q2: Feeling down, depressed or hopeless Not at all   PHQ-2 Score 0       Abuse: Current or Past (Physical, Sexual or Emotional) - No  Do you feel safe in your environment? Yes        Social History     Tobacco Use     Smoking status: Never Smoker     Smokeless tobacco: Never Used   Substance Use Topics     Alcohol use: No     If you drink alcohol do you typically have >3 drinks per day or >7 drinks per week? No    Alcohol Use 10/9/2020   Prescreen: >3 drinks/day or >7 drinks/week? No   Prescreen: >3 drinks/day or >7 drinks/week? -       Reviewed orders with patient.  Reviewed health maintenance and updated orders accordingly - Yes  BP Readings from Last 3 Encounters:   10/09/20 122/60   03/24/20 132/82   07/25/19 104/71    Wt Readings from Last 3 Encounters:   10/09/20 66.4 kg (146 lb 6.4 oz)   04/19/19 61.8 kg (136 lb 4 oz)   04/13/18 68.9 kg (152 lb)                  Patient Active Problem List   Diagnosis     Contraception     CARDIOVASCULAR SCREENING;  LDL GOAL LESS THAN 160     Hyperlipidemia LDL goal <160     Past Surgical History:   Procedure Laterality Date     HC TOOTH EXTRACTION W/FORCEP         Social History     Tobacco Use     Smoking status: Never Smoker     Smokeless tobacco: Never Used   Substance Use Topics     Alcohol use: No     Family History   Problem Relation Age of Onset     Hypertension Brother      Diabetes Maternal Grandmother      Cancer Maternal Grandmother         ovarian CA in her 70s     Lung Cancer Maternal Grandfather         smoker, age 56     Hypertension Paternal Grandmother      Cerebrovascular Disease Paternal Grandmother         stroke, 80s     Hypertension Paternal Grandfather      Heart Disease Paternal Grandfather         MI at age 56           Mammogram not appropriate for this patient based on age.    Pertinent mammograms are reviewed under the imaging tab.  History of abnormal Pap smear: NO - age 30-65 PAP every 5 years with negative HPV co-testing recommended  PAP / HPV Latest Ref Rng & Units 3/8/2017 8/23/2013 7/28/2011   PAP - NIL NIL NIL   HPV 16 DNA NEG Negative - -   HPV 18 DNA NEG Negative - -   OTHER HR HPV NEG Negative - -     Reviewed and updated as needed this visit by clinical staff  Tobacco  Allergies  Meds   Med Hx  Surg Hx  Fam Hx  Soc Hx        Reviewed and updated as needed this visit by Provider  Tobacco  Allergies  Meds  Problems  Med Hx  Surg Hx  Fam Hx             Review of Systems   Constitutional: Negative for chills and fever.   HENT: Negative for congestion, ear pain, hearing loss and sore throat.    Eyes: Negative for pain and visual disturbance.   Respiratory: Negative for cough and shortness of breath.    Cardiovascular: Negative for chest pain, palpitations and peripheral edema.   Gastrointestinal: Negative for abdominal pain, constipation, diarrhea, heartburn, hematochezia and nausea.   Breasts:  Negative for tenderness, breast mass and discharge.   Genitourinary: Negative for  "dysuria, frequency, genital sores, hematuria, pelvic pain, urgency, vaginal bleeding and vaginal discharge.   Musculoskeletal: Negative for arthralgias, joint swelling and myalgias.   Skin: Negative for rash.   Neurological: Negative for dizziness, weakness, headaches and paresthesias.   Psychiatric/Behavioral: Negative for mood changes. The patient is not nervous/anxious.      Periods are regular, monthly and not symptomatic while on OCPs.      OBJECTIVE:   /60   Pulse 87   Temp 97.7  F (36.5  C) (Temporal)   Resp 12   Ht 1.69 m (5' 6.54\")   Wt 66.4 kg (146 lb 6.4 oz)   LMP 10/09/2020 (Exact Date)   SpO2 100%   BMI 23.25 kg/m    Physical Exam  GENERAL: healthy, alert and no distress  EYES: Eyes grossly normal to inspection, PERRL and conjunctivae and sclerae normal  HENT: ear canals and TM's normal, nose and mouth without ulcers or lesions  NECK: no adenopathy, no asymmetry, masses, or scars and thyroid normal to palpation, no bruits.   RESP: lungs clear to auscultation - no rales, rhonchi or wheezes  BREAST: not examined today. Exam declined by patient.   CV: regular rate and rhythm, normal S1 S2, no S3 or S4, no murmur, click or rub, no peripheral edema and peripheral pulses strong  ABDOMEN: soft, nontender, no hepatosplenomegaly, no masses and bowel sounds normal  Pelvic exam declined today (she has menses today and declines pap)  MS: no gross musculoskeletal defects noted, no edema  SKIN: no suspicious lesions or rashes  NEURO: Normal strength and tone, mentation intact and speech normal  PSYCH: mentation appears normal, affect normal/bright      ASSESSMENT/PLAN:       ICD-10-CM    1. Routine general medical examination at a health care facility  Z00.00    2. Encounter for surveillance of contraceptive pills  Z30.41 desogestrel-ethinyl estradiol (DESOGEN) 0.15-30 MG-MCG tablet   3. Calculus of gallbladder without cholecystitis without obstruction  K80.20 GENERAL SURG ADULT REFERRAL   4. Encounter " "for immunization  Z23 HEPATITIS A VACCINE (ADULT)       Patient has been advised of split billing requirements and indicates understanding: Yes  Recommend yearly pe, monthly SBE.   Pap every 3-5 years, she prefers q 5.   Hep A vaccine discussed, given.   Declines flu vaccine.     COUNSELING:  Reviewed preventive health counseling, as reflected in patient instructions  Special attention given to:        Regular exercise       Healthy diet/nutrition       Vision screening       Immunizations    Vaccinated for: Hepatitis A and Declined: Influenza due to Conscientious objector               Contraception refilled for 1 year.     Estimated body mass index is 21.86 kg/m  as calculated from the following:    Height as of 4/19/19: 1.681 m (5' 6.2\").    Weight as of 4/19/19: 61.8 kg (136 lb 4 oz).        She reports that she has never smoked. She has never used smokeless tobacco.      Counseling Resources:  ATP IV Guidelines  Pooled Cohorts Equation Calculator  Breast Cancer Risk Calculator  BRCA-Related Cancer Risk Assessment: FHS-7 Tool  FRAX Risk Assessment  ICSI Preventive Guidelines  Dietary Guidelines for Americans, 2010  USDA's MyPlate  ASA Prophylaxis  Lung CA Screening    Aileen Dominguez MD  [facility]  "

## 2020-10-09 NOTE — PATIENT INSTRUCTIONS
Someone will be calling you to set up a consultation with our general surgeons about having your gallbladder removed.       Preventive Health Recommendations  Female Ages 26 - 39  Yearly exam:   See your health care provider every year in order to    Review health changes.     Discuss preventive care.      Review your medicines if you your doctor has prescribed any.    Until age 30: Get a Pap test every three years (more often if you have had an abnormal result).    After age 30: Talk to your doctor about whether you should have a Pap test every 3 years or have a Pap test with HPV screening every 5 years.   You do not need a Pap test if your uterus was removed (hysterectomy) and you have not had cancer.  You should be tested each year for STDs (sexually transmitted diseases), if you're at risk.   Talk to your provider about how often to have your cholesterol checked.  If you are at risk for diabetes, you should have a diabetes test (fasting glucose).  Shots: Get a flu shot each year. Get a tetanus shot every 10 years.   Nutrition:     Eat at least 5 servings of fruits and vegetables each day.    Eat whole-grain bread, whole-wheat pasta and brown rice instead of white grains and rice.    Get adequate Calcium and Vitamin D.     Lifestyle    Exercise at least 150 minutes a week (30 minutes a day, 5 days of the week). This will help you control your weight and prevent disease.    Limit alcohol to one drink per day.    No smoking.     Wear sunscreen to prevent skin cancer.    See your dentist every six months for an exam and cleaning.

## 2020-10-23 ENCOUNTER — OFFICE VISIT (OUTPATIENT)
Dept: SURGERY | Facility: CLINIC | Age: 35
End: 2020-10-23
Attending: FAMILY MEDICINE
Payer: COMMERCIAL

## 2020-10-23 VITALS
BODY MASS INDEX: 22.91 KG/M2 | SYSTOLIC BLOOD PRESSURE: 122 MMHG | WEIGHT: 146 LBS | TEMPERATURE: 98.9 F | DIASTOLIC BLOOD PRESSURE: 64 MMHG | HEIGHT: 67 IN

## 2020-10-23 DIAGNOSIS — K80.50 BILIARY COLIC SYMPTOM: Primary | ICD-10-CM

## 2020-10-23 DIAGNOSIS — K80.10 CHRONIC CHOLECYSTITIS WITH CALCULUS: ICD-10-CM

## 2020-10-23 DIAGNOSIS — E78.5 HYPERLIPIDEMIA LDL GOAL <160: ICD-10-CM

## 2020-10-23 PROCEDURE — 99204 OFFICE O/P NEW MOD 45 MIN: CPT | Performed by: SURGERY

## 2020-10-23 ASSESSMENT — MIFFLIN-ST. JEOR: SCORE: 1372.57

## 2020-10-23 NOTE — PROGRESS NOTES
General Surgery Consultation    Rosemarie Wade MRN# [id number]   Age: 35 year old YOB: 1985     Reason for consult: Cholecystitis                        Assessment and Plan:   I was asked to see this patient at the request of Dr. Dominguez  for evaluation of biliary colic.  Rosemarie Wade is a 35 year old female who presented with history, exam, laboratory and imaging most consistent with:        ICD-10-CM    1. Biliary colic symptom  K80.50 Case Request: Laparoscopic cholecystectomy, possible open     Case Request: Laparoscopic cholecystectomy, possible open   2. Chronic cholecystitis with calculus  K80.10 Case Request: Laparoscopic cholecystectomy, possible open     Case Request: Laparoscopic cholecystectomy, possible open   3. Hyperlipidemia LDL goal <160  E78.5      The patient was thoroughly counseled regarding their Biliary colic symptom [K80.50].     The patient was informed that the proposed procedure or medical intervention involves removal of the gallbladder laparoscopically (with small incisions and camera) possibly open and does offer a very good likelihood of symptom relief.     The patient was made aware of the risks of the procedure, including but not limited to:    bleeding, conversion to open, bile leak, bile duct injury or other adjacent organ injury, retained gallstones, cardiac or pulmonary related complications and anesthesia complications. Also that difficulties may be encountered during recovery to include: wound or deep intraabdominal infection, wound dehiscence, incisional hernia, bile leak or retained stone requiring ERCP.     In the course of the evaluation we did discuss other therapeutic options with the patient, including antibiotics and/or drainage. The risks and benefits of these options were also discussed which include but are not limited to: recurrent worsening episodes.     Also discussed were possible problems or difficulties the patient may encounter if  "treatment was not pursued at this time. These include: worsening episodes, cholangitis and/or pancreatitis.     The patient was informed that [facility] will be primarily responsible for the procedure. Assistance during the procedure and during hospitalization may also be provided by other physicians, nurses and technicians.     The patient was also informed that if exposure to the patient s blood or body fluids occurs during the procedure, HIV testing of the patient will occur unless they refuse at this time. Risk of blood transfusion is minimal.     The patient will be provided additional education resources by the support staff. If there are ever any questions regarding their diagnosis or the procedure, the patient is encouraged to ask.     All of the patient s or their legal representative s questions have been answered to their satisfaction and they have indicated a clear understanding of this discussion.   Rosemarie expressed understanding of risks, benefits and alternatives and wished to proceed.     All findings, test results, and diagnosis were discussed with the patient. Rosemarie  participated in the decision making process and agreed with the plan of care. Questions were sought and answered.       I thank Dr. Dominguez for the opportunity to participate in the patient's care.           Chief Complaint:   Biliary colic; gallstones     History is obtained from the patient         History of Present Illness:   This patient is a 35 year old  female without a significant past medical history who presents with biliary colic.    1st major in March 2020 after grill chicken and asparagus; ingestion followed by RUQ pain, radiates into the back and up the right shoulder.  Last 24hrs.  Went to ED; noted GBUS - noted stones - or acute cholecystitis.  +nausea and vomiting.  Havent had anything that  severe since.   Have had \"uncomfortable\" epidoses since.   Been trying to stay away from \"fatty\" foods since. " "No melena; no hematochezia; no acolic stools.   Mom had similar symptoms -  But have had any work up.  Never had any PSH; never had PMH.  No medications; no MI; no CVA.  Never had general anesthesia.           Past Medical History:    has a past medical history of NO ACTIVE PROBLEMS.          Past Surgical History:     Past Surgical History:   Procedure Laterality Date     HC TOOTH EXTRACTION W/FORCEP             Medications:        desogestrel-ethinyl estradiol (DESOGEN) 0.15-30 MG-MCG tablet, Take 1 tablet by mouth daily    No current facility-administered medications on file prior to visit.         Allergies:      Allergies   Allergen Reactions     No Known Drug Allergies             Social History:   Rosemarie Wade  reports that she has never smoked. She has never used smokeless tobacco. She reports that she does not drink alcohol or use drugs.          Family History:   The patient has no family history of any bleeding, clotting or anesthesia problems.          Review of Systems:     Constitutional: Denies fever or chills   Eyes: Denies change in visual acuity   HENT: Denies nasal congestion or sore throat   Respiratory: Denies cough or shortness of breath   Cardiovascular: Denies chest pain or edema   GI: Denies  bloody stools or diarrhea; +abdominal pain, nausea, vomiting,  : Denies dysuria   Musculoskeletal: Denies back pain or joint pain   Integument: Denies rash   Neurologic: Denies headache, focal weakness or sensory changes   Endocrine: Denies polyuria or polydipsia   Lymphatic: Denies swollen glands   Psychiatric: Denies depression or anxiety          Physical Exam:     Vitals: /64   Temp 98.9  F (37.2  C) (Temporal)   Ht 1.69 m (5' 6.54\")   Wt 66.2 kg (146 lb)   LMP 10/09/2020 (Exact Date)   BMI 23.18 kg/m    BMI= Body mass index is 23.18 kg/m .  Constitutional: Awake, alert, no acute distress.  Eyes:  No scleral icterus.  Conjunctiva are without injection.  ENMT: Mucous membranes " "moist, dentition and gums are intact.   Neck: Soft, supple, trachea midline.    Endocrine: n/a  Lymphatic: There is no cervical, submandibular, or supraclavicular adenopathy.  Respiratory: No audible wheezes, no acute distress  Cardiovascular: Regular; S1, S2    Abdomen: Non-distended, non-tender, normoactive bowel sounds present, No masses  Musculoskeletal: No spinal or CVA tenderness. Full range of motion in the upper and lower extremities.    Skin: No skin rashes or lesions to inspection.  No petechia.    Neurologic: Cranial nerves II through XII are grossly intact and symmetric.  Psychiatric: The patient is alert and oriented times 3.  The patient's affect is not blunted and mood is appropriate.          Data:   Vital Signs:  /64   Temp 98.9  F (37.2  C) (Temporal)   Ht 1.69 m (5' 6.54\")   Wt 66.2 kg (146 lb)   LMP 10/09/2020 (Exact Date)   BMI 23.18 kg/m       WBC -   WBC   Date Value Ref Range Status   03/24/2020 8.5 4.0 - 11.0 10e9/L Final   ], HgB -   Hemoglobin   Date Value Ref Range Status   03/24/2020 13.5 11.7 - 15.7 g/dL Final   ]   Liver Function Studies -   Recent Labs   Lab Test 03/24/20  1034   PROTTOTAL 7.4   ALBUMIN 3.8   BILITOTAL 0.5   ALKPHOS 51   AST 13   ALT 19     No results found for this or any previous visit (from the past 744 hour(s)).     [facility], DO 10/23/2020 1:30 PM    Disclaimer: This note consists of words and symbols derived from keyboarding and dictation using voice recognition software.  As a result, there may be errors that have gone undetected.  Please consider this when interpreting information found in this note.       "

## 2020-10-23 NOTE — LETTER
10/23/2020         RE: Rosemarie Wade  [address] [facility] [address]        Dear Colleague,    Thank you for referring your patient, Rosemarie Wade, to the [facility]. Please see a copy of my visit note below.    General Surgery Consultation    Rosemarie Wade MRN# [id number]   Age: 35 year old YOB: 1985     Reason for consult: Cholecystitis                        Assessment and Plan:   I was asked to see this patient at the request of Dr. Dominguez  for evaluation of biliary colic.  Rosemarie Wade is a 35 year old female who presented with history, exam, laboratory and imaging most consistent with:        ICD-10-CM    1. Biliary colic symptom  K80.50 Case Request: Laparoscopic cholecystectomy, possible open     Case Request: Laparoscopic cholecystectomy, possible open   2. Chronic cholecystitis with calculus  K80.10 Case Request: Laparoscopic cholecystectomy, possible open     Case Request: Laparoscopic cholecystectomy, possible open   3. Hyperlipidemia LDL goal <160  E78.5      The patient was thoroughly counseled regarding their Biliary colic symptom [K80.50].     The patient was informed that the proposed procedure or medical intervention involves removal of the gallbladder laparoscopically (with small incisions and camera) possibly open and does offer a very good likelihood of symptom relief.     The patient was made aware of the risks of the procedure, including but not limited to:    bleeding, conversion to open, bile leak, bile duct injury or other adjacent organ injury, retained gallstones, cardiac or pulmonary related complications and anesthesia complications. Also that difficulties may be encountered during recovery to include: wound or deep intraabdominal infection, wound dehiscence, incisional hernia, bile leak or retained stone requiring ERCP.     In the course of the evaluation we did discuss other therapeutic options with the  patient, including antibiotics and/or drainage. The risks and benefits of these options were also discussed which include but are not limited to: recurrent worsening episodes.     Also discussed were possible problems or difficulties the patient may encounter if treatment was not pursued at this time. These include: worsening episodes, cholangitis and/or pancreatitis.     The [facility] was informed [facility] will be primarily responsible for the procedure. Assistance during the procedure and during hospitalization may also be provided by other physicians, nurses and technicians.     The patient was also informed that if exposure to the patient s blood or body fluids occurs during the procedure, HIV testing of the patient will occur unless they refuse at this time. Risk of blood transfusion is minimal.     The patient will be provided additional education resources by the support staff. If there are ever any questions regarding their diagnosis or the procedure, the patient is encouraged to ask.     All of the patient s or their legal representative s questions have been answered to their satisfaction and they have indicated a clear understanding of this discussion.   Rosemarie expressed understanding of risks, benefits and alternatives and wished to proceed.     All findings, test results, and diagnosis were discussed with the patient. Rosemarie  participated in the decision making process and agreed with the plan of care. Questions were sought and answered.       I thank Dr. Dominguez for the opportunity to participate in the patient's care.           Chief Complaint:   Biliary colic; gallstones     History is obtained from the patient         History of Present Illness:   This patient is a 35 year old  female without a significant past medical history who presents with biliary colic.    1st major in March 2020 after grill chicken and asparagus; ingestion followed by RUQ pain, radiates into the back  "and up the right shoulder.  Last 24hrs.  Went to ED; noted GBUS - noted stones - or acute cholecystitis.  +nausea and vomiting.  Havent had anything that  severe since.   Have had \"uncomfortable\" epidoses since.   Been trying to stay away from \"fatty\" foods since. No melena; no hematochezia; no acolic stools.   Mom had similar symptoms -  But have had any work up.  Never had any PSH; never had PMH.  No medications; no MI; no CVA.  Never had general anesthesia.           Past Medical History:    has a past medical history of NO ACTIVE PROBLEMS.          Past Surgical History:     Past Surgical History:   Procedure Laterality Date     HC TOOTH EXTRACTION W/FORCEP             Medications:        desogestrel-ethinyl estradiol (DESOGEN) 0.15-30 MG-MCG tablet, Take 1 tablet by mouth daily    No current facility-administered medications on file prior to visit.         Allergies:      Allergies   Allergen Reactions     No Known Drug Allergies             Social History:   Rosemarie Wade  reports that she has never smoked. She has never used smokeless tobacco. She reports that she does not drink alcohol or use drugs.          Family History:   The patient has no family history of any bleeding, clotting or anesthesia problems.          Review of Systems:     Constitutional: Denies fever or chills   Eyes: Denies change in visual acuity   HENT: Denies nasal congestion or sore throat   Respiratory: Denies cough or shortness of breath   Cardiovascular: Denies chest pain or edema   GI: Denies  bloody stools or diarrhea; +abdominal pain, nausea, vomiting,  : Denies dysuria   Musculoskeletal: Denies back pain or joint pain   Integument: Denies rash   Neurologic: Denies headache, focal weakness or sensory changes   Endocrine: Denies polyuria or polydipsia   Lymphatic: Denies swollen glands   Psychiatric: Denies depression or anxiety          Physical Exam:     Vitals: /64   Temp 98.9  F (37.2  C) (Temporal)   Ht 1.69 m " "(5' 6.54\")   Wt 66.2 kg (146 lb)   LMP 10/09/2020 (Exact Date)   BMI 23.18 kg/m    BMI= Body mass index is 23.18 kg/m .  Constitutional: Awake, alert, no acute distress.  Eyes:  No scleral icterus.  Conjunctiva are without injection.  ENMT: Mucous membranes moist, dentition and gums are intact.   Neck: Soft, supple, trachea midline.    Endocrine: n/a  Lymphatic: There is no cervical, submandibular, or supraclavicular adenopathy.  Respiratory: No audible wheezes, no acute distress  Cardiovascular: Regular; S1, S2    Abdomen: Non-distended, non-tender, normoactive bowel sounds present, No masses  Musculoskeletal: No spinal or CVA tenderness. Full range of motion in the upper and lower extremities.    Skin: No skin rashes or lesions to inspection.  No petechia.    Neurologic: Cranial nerves II through XII are grossly intact and symmetric.  Psychiatric: The patient is alert and oriented times 3.  The patient's affect is not blunted and mood is appropriate.          Data:   Vital Signs:  /64   Temp 98.9  F (37.2  C) (Temporal)   Ht 1.69 m (5' 6.54\")   Wt 66.2 kg (146 lb)   LMP 10/09/2020 (Exact Date)   BMI 23.18 kg/m       WBC -   WBC   Date Value Ref Range Status   03/24/2020 8.5 4.0 - 11.0 10e9/L Final   ], HgB -   Hemoglobin   Date Value Ref Range Status   03/24/2020 13.5 11.7 - 15.7 g/dL Final   ]   Liver Function Studies -   Recent Labs   Lab Test 03/24/20  1034   PROTTOTAL 7.4   ALBUMIN 3.8   BILITOTAL 0.5   ALKPHOS 51   AST 13   ALT 19     No results found for this or any previous visit (from the past 744 hour(s)).     [facility], DO 10/23/2020 1:30 PM    Disclaimer: This note consists of words and symbols derived from keyboarding and dictation using voice recognition software.  As a result, there may be errors that have gone undetected.  Please consider this when interpreting information found in this note.           Again, thank you for allowing me to participate in the care of your patient.  "       Sincerely,        [facility] MD Clarissa

## 2020-10-26 ENCOUNTER — TELEPHONE (OUTPATIENT)
Dept: SURGERY | Facility: CLINIC | Age: 35
End: 2020-10-26

## 2020-10-26 DIAGNOSIS — Z11.59 ENCOUNTER FOR SCREENING FOR OTHER VIRAL DISEASES: Primary | ICD-10-CM

## 2020-10-26 PROBLEM — K80.10 CHRONIC CHOLECYSTITIS WITH CALCULUS: Status: ACTIVE | Noted: 2020-10-26

## 2020-10-26 PROBLEM — K80.50 BILIARY COLIC SYMPTOM: Status: ACTIVE | Noted: 2020-10-26

## 2020-10-26 NOTE — TELEPHONE ENCOUNTER
Type of surgery: Laparoscopic cholecystectomy, possible open (N/A)   Location of surgery: [facility]  Date and time of surgery: 11/12  Surgeon: Clarissa  Pre-Op Appt Date: 11/9  Post-Op Appt Date: 11/24   Packet sent out: Yes  Pre-cert/Authorization completed:  No  Date: na

## 2020-11-08 DIAGNOSIS — Z11.59 ENCOUNTER FOR SCREENING FOR OTHER VIRAL DISEASES: ICD-10-CM

## 2020-11-08 LAB
SARS-COV-2 RNA SPEC QL NAA+PROBE: NOT DETECTED
SPECIMEN SOURCE: NORMAL

## 2020-11-08 PROCEDURE — U0003 INFECTIOUS AGENT DETECTION BY NUCLEIC ACID (DNA OR RNA); SEVERE ACUTE RESPIRATORY SYNDROME CORONAVIRUS 2 (SARS-COV-2) (CORONAVIRUS DISEASE [COVID-19]), AMPLIFIED PROBE TECHNIQUE, MAKING USE OF HIGH THROUGHPUT TECHNOLOGIES AS DESCRIBED BY CMS-2020-01-R: HCPCS | Performed by: SURGERY

## 2020-11-09 ENCOUNTER — OFFICE VISIT (OUTPATIENT)
Dept: FAMILY MEDICINE | Facility: CLINIC | Age: 35
End: 2020-11-09
Payer: COMMERCIAL

## 2020-11-09 VITALS
BODY MASS INDEX: 23.03 KG/M2 | WEIGHT: 145 LBS | HEART RATE: 90 BPM | TEMPERATURE: 97.4 F | RESPIRATION RATE: 14 BRPM | DIASTOLIC BLOOD PRESSURE: 80 MMHG | SYSTOLIC BLOOD PRESSURE: 110 MMHG | OXYGEN SATURATION: 99 %

## 2020-11-09 DIAGNOSIS — Z01.818 PREOP GENERAL PHYSICAL EXAM: Primary | ICD-10-CM

## 2020-11-09 DIAGNOSIS — K80.10 CHRONIC CHOLECYSTITIS WITH CALCULUS: ICD-10-CM

## 2020-11-09 PROCEDURE — 99214 OFFICE O/P EST MOD 30 MIN: CPT | Performed by: NURSE PRACTITIONER

## 2020-11-09 ASSESSMENT — PAIN SCALES - GENERAL: PAINLEVEL: NO PAIN (0)

## 2020-11-09 NOTE — H&P (VIEW-ONLY)
[address] [address]  Phone: [phone]  Fax: [phone]  Primary Provider: Maryse Barker  Pre-op Performing Provider: MARYSE BARKER    PREOPERATIVE EVALUATION:  Today's date: 11/9/2020    Rosemarie Wade is a 35 year old female who presents for a preoperative evaluation.    Surgical Information:  Surgery/Procedure: Laparoscopic cholecystectomy, possible open  Surgery Location: [facility]  Surgeon: Romo  Surgery Date: 11/12/20  Time of Surgery: 8am  Where patient plans to recover: At home with family  Fax number for surgical facility: Note does not need to be faxed, will be available electronically in Epic.    Type of Anesthesia Anticipated: General    Subjective     HPI related to upcoming procedure: symptomatic cholecystitis    Preop Questions 11/9/2020   1. Have you ever had a heart attack or stroke? No   2. Have you ever had surgery on your heart or blood vessels, such as a stent placement, a coronary artery bypass, or surgery on an artery in your head, neck, heart, or legs? No   3. Do you have chest pain with activity? No   4. Do you have a history of  heart failure? No   5. Do you currently have a cold, bronchitis or symptoms of other infection? No   6. Do you have a cough, shortness of breath, or wheezing? No   7. Do you or anyone in your family have previous history of blood clots? No   8. Do you or does anyone in your family have a serious bleeding problem such as prolonged bleeding following surgeries or cuts? No   9. Have you ever had problems with anemia or been told to take iron pills? No   10. Have you had any abnormal blood loss such as black, tarry or bloody stools, or abnormal vaginal bleeding? No   11. Have you ever had a blood transfusion? No   12. Are you willing to have a blood transfusion if it is medically needed before, during, or after your surgery? Yes   13. Have you or any of your relatives ever had problems with  anesthesia? No   14. Do you have sleep apnea, excessive snoring or daytime drowsiness? No   15. Do you have any artifical heart valves or other implanted medical devices like a pacemaker, defibrillator, or continuous glucose monitor? No   16. Do you have artificial joints? No   17. Are you allergic to latex? No   18. Is there any chance that you may be pregnant? No           Review of Systems  CONSTITUTIONAL: NEGATIVE for fever, chills, change in weight  INTEGUMENTARY/SKIN: NEGATIVE for worrisome rashes, moles or lesions  EYES: NEGATIVE for vision changes or irritation  ENT/MOUTH: NEGATIVE for ear, mouth and throat problems  RESP: NEGATIVE for significant cough or SOB  BREAST: NEGATIVE for masses, tenderness or discharge  CV: NEGATIVE for chest pain, palpitations or peripheral edema  GI: NEGATIVE for nausea, abdominal pain, heartburn, or change in bowel habits  : NEGATIVE for frequency, dysuria, or hematuria  MUSCULOSKELETAL: NEGATIVE for significant arthralgias or myalgia  NEURO: NEGATIVE for weakness, dizziness or paresthesias  ENDOCRINE: NEGATIVE for temperature intolerance, skin/hair changes  HEME: NEGATIVE for bleeding problems  PSYCHIATRIC: NEGATIVE for changes in mood or affect    Patient Active Problem List    Diagnosis Date Noted     Biliary colic symptom 10/26/2020     Priority: Medium     Added automatically from request for surgery [id number]       Chronic cholecystitis with calculus 10/26/2020     Priority: Medium     Added automatically from request for surgery [id number]       Hyperlipidemia LDL goal <160 09/11/2013     Priority: Medium     CARDIOVASCULAR SCREENING; LDL GOAL LESS THAN 160 07/28/2011     Priority: Medium     Contraception 12/08/2009     Priority: Medium      Past Medical History:   Diagnosis Date     NO ACTIVE PROBLEMS      Past Surgical History:   Procedure Laterality Date     HC TOOTH EXTRACTION W/FORCEP       Current Outpatient Medications   Medication Sig Dispense Refill      desogestrel-ethinyl estradiol (DESOGEN) 0.15-30 MG-MCG tablet Take 1 tablet by mouth daily 84 tablet 3       Allergies   Allergen Reactions     No Known Drug Allergies         Social History     Tobacco Use     Smoking status: Never Smoker     Smokeless tobacco: Never Used   Substance Use Topics     Alcohol use: No       History   Drug Use No         Objective     /80   Pulse 90   Temp 97.4  F (36.3  C) (Temporal)   Resp 14   Wt 65.8 kg (145 lb)   LMP 11/06/2020   SpO2 99%   BMI 23.03 kg/m      Physical Exam    GENERAL APPEARANCE: healthy, alert and no distress     EYES: EOMI, PERRL     HENT: ear canals and TM's normal and nose and mouth without ulcers or lesions     NECK: no adenopathy, no asymmetry, masses, or scars and thyroid normal to palpation     RESP: lungs clear to auscultation - no rales, rhonchi or wheezes     CV: regular rates and rhythm, normal S1 S2, no S3 or S4 and no murmur, click or rub     ABDOMEN:  soft, nontender, no HSM or masses and bowel sounds normal     MS: extremities normal- no gross deformities noted, no evidence of inflammation in joints, FROM in all extremities.     SKIN: no suspicious lesions or rashes     NEURO: Normal strength and tone, sensory exam grossly normal, mentation intact and speech normal     PSYCH: mentation appears normal. and affect normal/bright     LYMPHATICS: No cervical adenopathy    Recent Labs   Lab Test 03/24/20  1034   HGB 13.5         POTASSIUM 3.8   CR 0.70        Diagnostics:  No labs were ordered during this visit.   No EKG required, no history of coronary heart disease, significant arrhythmia, peripheral arterial disease or other structural heart disease.    Revised Cardiac Risk Index (RCRI):  The patient has the following serious cardiovascular risks for perioperative complications:   - No serious cardiac risks = 0 points     RCRI Interpretation: 0 points: Class I (very low risk - 0.4% complication rate)           Assessment &  Plan   The proposed surgical procedure is considered INTERMEDIATE risk.    Preop general physical exam    Chronic cholecystitis with calculus         Risks and Recommendations:  The patient has the following additional risks and recommendations for perioperative complications:   - No identified additional risk factors other than previously addressed    Medication Instructions:  Patient is on no chronic medications    RECOMMENDATION:  APPROVAL GIVEN to proceed with proposed procedure, without further diagnostic evaluation.    Signed Electronically by: HILDA Watson CNP    Copy of this evaluation report is provided to requesting physician.    Preop [facility] Preop Guidelines    Revised Cardiac Risk Index

## 2020-11-09 NOTE — PATIENT INSTRUCTIONS
"Don't take any more Ibuprofen, Aleve, Naproxen or Aspirin prior to surgery.  Tylenol and/or prescription pain pills are okay to use if needed.       Preparing for Your Surgery  Getting started  A surgery nurse will call you to review your health history and instructions. They will give you an arrival time based on your scheduled surgery time.  Please be ready to share the following:    Your doctor's clinic name and phone number    Your medical, surgical and anesthesia history    A list of allergies and sensitivities    A list of medicines, including herbal treatments and over-the-counter drugs    Whether the patient has a legal guardian (ask how to send us the papers in advance)  If your child is having surgery, please ask for a copy of Preparing for Your Child's Surgery.    Preparing for surgery    Within 30 days of surgery: Have an exam at your family clinic (primary care clinic), or go to a pre-operative clinic. This exam is called a \"History and Physical,\" or H&P.    At your H&P exam, talk to your care team about all medicines you take. If you need to stop any medicines before surgery, ask when to start taking them again.  ? We do this for your safety. Many medicines can make you bleed too much during surgery. Some change how well surgery (anesthesia) drugs work.    Call your insurance company to see what it will and won't pay for. Ask if they need to pre-approve the surgery. (If no insurance, call [phone].)    Call your surgeon's clinic if there's any change in your health. This includes signs of a cold or flu (sore throat, runny nose, cough, rash, fever). It also includes a scrape or scratch near the surgery site.    If you have questions on the day of surgery, call your surgery center.  Eating and drinking guidelines  For your safety: Unless your surgeon tells you otherwise, follow the guidelines below.    Eat and drink as usual until 8 hours before surgery. After that, no food or milk.    Drink clear " liquids until 2 hours before surgery. These are liquids you can see through, like water, Gatorade and Propel Water. You may also have black coffee and tea (no cream or milk).    Nothing by mouth within 2 hours of surgery. This includes gum, candy and breath mints.    Stop alcohol the midnight before surgery.    If your family clinic tells you to take medicine on the morning of surgery, it's okay to take it with a sip of water.  Preventing infection    Shower or bathe the night before and morning of your surgery. Follow the instructions your clinic gave you. (If no instructions, use regular soap.)    Don't shave or clip hair near your surgery site. This can lead to skin infection.    Don't smoke the morning of surgery. Smoking increases the risk of infection. You may chew nicotine gum up to 2 hours before surgery. A nicotine patch is okay.  ? Note: Some surgeries require you to completely quit smoking and nicotine. Check with your surgeon.    Your care team will make every effort to keep you safe from infection. We will:  ? Clean our hands often with soap and water (or an alcohol-based hand rub).  ? Clean the skin at your surgery site with a special soap that kills germs. We'll also remove hair from the site as needed.  ? Wear special hair covers, masks, gowns and gloves during surgery.  ? Give antibiotic medicine, if prescribed. Not all surgeries need antibiotics.  What to bring on the day of surgery    Photo ID and insurance card    Copy of your health care directive, if you have one    Glasses and hearing aides (bring cases)  ? You can't wear contacts during surgery    Inhaler and eye drops, if you use them (tell us about these when you arrive)    CPAP machine or breathing device, if you use them    A few personal items, if spending the night    If you have . . .  ? A pacemaker or ICD (cardiac defibrillator): Bring the ID card.  ? An implanted stimulator: Bring the remote control.  ? A legal guardian: Bring a copy  of the certified (court-stamped) guardianship papers.  Please remove any jewelry, including body piercings. Leave jewelry and other valuables at home.  If you're going home the day of surgery  Important: If you don't follow the rules below, we must cancel your surgery.     Arrange for someone to drive you home after surgery. You may not drive, take a taxi or take public transportation by yourself (unless you'll have local anesthesia only).    Arrange for a responsible adult to stay with you overnight. If you don't, we may keep you in the hospital overnight, and you may need to pay the costs yourself.  Questions?   If you have any questions for your care team, list them here: _________________________________________________________________________________________________________________________________________________________________________________________________________________________________________________________________________________________________________________________  For informational purposes only. Not to replace the advice of your health care provider. Copyright   6133-0555 [employer]. All rights reserved. Clinically reviewed by Hailey Hobbs MD. [employer] [id number] - REV 07/19.

## 2020-11-09 NOTE — PROGRESS NOTES
[address] [address]  Phone: [phone]  Fax: [phone]  Primary Provider: Maryse Barker  Pre-op Performing Provider: MARYSE BARKER    PREOPERATIVE EVALUATION:  Today's date: 11/9/2020    Rosemarie Wade is a 35 year old female who presents for a preoperative evaluation.    Surgical Information:  Surgery/Procedure: Laparoscopic cholecystectomy, possible open  Surgery Location: [facility]  Surgeon: Romo  Surgery Date: 11/12/20  Time of Surgery: 8am  Where patient plans to recover: At home with family  Fax number for surgical facility: Note does not need to be faxed, will be available electronically in Epic.    Type of Anesthesia Anticipated: General    Subjective     HPI related to upcoming procedure: symptomatic cholecystitis    Preop Questions 11/9/2020   1. Have you ever had a heart attack or stroke? No   2. Have you ever had surgery on your heart or blood vessels, such as a stent placement, a coronary artery bypass, or surgery on an artery in your head, neck, heart, or legs? No   3. Do you have chest pain with activity? No   4. Do you have a history of  heart failure? No   5. Do you currently have a cold, bronchitis or symptoms of other infection? No   6. Do you have a cough, shortness of breath, or wheezing? No   7. Do you or anyone in your family have previous history of blood clots? No   8. Do you or does anyone in your family have a serious bleeding problem such as prolonged bleeding following surgeries or cuts? No   9. Have you ever had problems with anemia or been told to take iron pills? No   10. Have you had any abnormal blood loss such as black, tarry or bloody stools, or abnormal vaginal bleeding? No   11. Have you ever had a blood transfusion? No   12. Are you willing to have a blood transfusion if it is medically needed before, during, or after your surgery? Yes   13. Have you or any of your relatives ever had problems with  anesthesia? No   14. Do you have sleep apnea, excessive snoring or daytime drowsiness? No   15. Do you have any artifical heart valves or other implanted medical devices like a pacemaker, defibrillator, or continuous glucose monitor? No   16. Do you have artificial joints? No   17. Are you allergic to latex? No   18. Is there any chance that you may be pregnant? No           Review of Systems  CONSTITUTIONAL: NEGATIVE for fever, chills, change in weight  INTEGUMENTARY/SKIN: NEGATIVE for worrisome rashes, moles or lesions  EYES: NEGATIVE for vision changes or irritation  ENT/MOUTH: NEGATIVE for ear, mouth and throat problems  RESP: NEGATIVE for significant cough or SOB  BREAST: NEGATIVE for masses, tenderness or discharge  CV: NEGATIVE for chest pain, palpitations or peripheral edema  GI: NEGATIVE for nausea, abdominal pain, heartburn, or change in bowel habits  : NEGATIVE for frequency, dysuria, or hematuria  MUSCULOSKELETAL: NEGATIVE for significant arthralgias or myalgia  NEURO: NEGATIVE for weakness, dizziness or paresthesias  ENDOCRINE: NEGATIVE for temperature intolerance, skin/hair changes  HEME: NEGATIVE for bleeding problems  PSYCHIATRIC: NEGATIVE for changes in mood or affect    Patient Active Problem List    Diagnosis Date Noted     Biliary colic symptom 10/26/2020     Priority: Medium     Added automatically from request for surgery [id number]       Chronic cholecystitis with calculus 10/26/2020     Priority: Medium     Added automatically from request for surgery [id number]       Hyperlipidemia LDL goal <160 09/11/2013     Priority: Medium     CARDIOVASCULAR SCREENING; LDL GOAL LESS THAN 160 07/28/2011     Priority: Medium     Contraception 12/08/2009     Priority: Medium      Past Medical History:   Diagnosis Date     NO ACTIVE PROBLEMS      Past Surgical History:   Procedure Laterality Date     HC TOOTH EXTRACTION W/FORCEP       Current Outpatient Medications   Medication Sig Dispense Refill      desogestrel-ethinyl estradiol (DESOGEN) 0.15-30 MG-MCG tablet Take 1 tablet by mouth daily 84 tablet 3       Allergies   Allergen Reactions     No Known Drug Allergies         Social History     Tobacco Use     Smoking status: Never Smoker     Smokeless tobacco: Never Used   Substance Use Topics     Alcohol use: No       History   Drug Use No         Objective     /80   Pulse 90   Temp 97.4  F (36.3  C) (Temporal)   Resp 14   Wt 65.8 kg (145 lb)   LMP 11/06/2020   SpO2 99%   BMI 23.03 kg/m      Physical Exam    GENERAL APPEARANCE: healthy, alert and no distress     EYES: EOMI, PERRL     HENT: ear canals and TM's normal and nose and mouth without ulcers or lesions     NECK: no adenopathy, no asymmetry, masses, or scars and thyroid normal to palpation     RESP: lungs clear to auscultation - no rales, rhonchi or wheezes     CV: regular rates and rhythm, normal S1 S2, no S3 or S4 and no murmur, click or rub     ABDOMEN:  soft, nontender, no HSM or masses and bowel sounds normal     MS: extremities normal- no gross deformities noted, no evidence of inflammation in joints, FROM in all extremities.     SKIN: no suspicious lesions or rashes     NEURO: Normal strength and tone, sensory exam grossly normal, mentation intact and speech normal     PSYCH: mentation appears normal. and affect normal/bright     LYMPHATICS: No cervical adenopathy    Recent Labs   Lab Test 03/24/20  1034   HGB 13.5         POTASSIUM 3.8   CR 0.70        Diagnostics:  No labs were ordered during this visit.   No EKG required, no history of coronary heart disease, significant arrhythmia, peripheral arterial disease or other structural heart disease.    Revised Cardiac Risk Index (RCRI):  The patient has the following serious cardiovascular risks for perioperative complications:   - No serious cardiac risks = 0 points     RCRI Interpretation: 0 points: Class I (very low risk - 0.4% complication rate)           Assessment &  Plan   The proposed surgical procedure is considered INTERMEDIATE risk.    Preop general physical exam    Chronic cholecystitis with calculus         Risks and Recommendations:  The patient has the following additional risks and recommendations for perioperative complications:   - No identified additional risk factors other than previously addressed    Medication Instructions:  Patient is on no chronic medications    RECOMMENDATION:  APPROVAL GIVEN to proceed with proposed procedure, without further diagnostic evaluation.    Signed Electronically by: HILDA Watson CNP    Copy of this evaluation report is provided to requesting physician.    Preop [facility] Preop Guidelines    Revised Cardiac Risk Index

## 2020-11-11 ENCOUNTER — ANESTHESIA EVENT (OUTPATIENT)
Dept: SURGERY | Facility: CLINIC | Age: 35
End: 2020-11-11
Payer: COMMERCIAL

## 2020-11-11 SDOH — HEALTH STABILITY: MENTAL HEALTH: CURRENT SMOKER: 0

## 2020-11-12 ENCOUNTER — ANESTHESIA (OUTPATIENT)
Dept: SURGERY | Facility: CLINIC | Age: 35
End: 2020-11-12
Payer: COMMERCIAL

## 2020-11-12 ENCOUNTER — HOSPITAL ENCOUNTER (OUTPATIENT)
Facility: CLINIC | Age: 35
Discharge: HOME | End: 2020-11-12
Attending: SURGERY | Admitting: SURGERY
Payer: COMMERCIAL

## 2020-11-12 VITALS
DIASTOLIC BLOOD PRESSURE: 66 MMHG | TEMPERATURE: 99 F | WEIGHT: 145 LBS | HEIGHT: 66 IN | RESPIRATION RATE: 18 BRPM | OXYGEN SATURATION: 100 % | SYSTOLIC BLOOD PRESSURE: 100 MMHG | BODY MASS INDEX: 23.3 KG/M2 | HEART RATE: 70 BPM

## 2020-11-12 DIAGNOSIS — K80.50 BILIARY COLIC SYMPTOM: ICD-10-CM

## 2020-11-12 DIAGNOSIS — K80.10 CHRONIC CHOLECYSTITIS WITH CALCULUS: ICD-10-CM

## 2020-11-12 DIAGNOSIS — Z90.49 S/P LAPAROSCOPIC CHOLECYSTECTOMY: Primary | ICD-10-CM

## 2020-11-12 LAB — B-HCG SERPL-ACNC: <1 IU/L (ref 0–5)

## 2020-11-12 PROCEDURE — 258N000003 HC RX IP 258 OP 636: Performed by: NURSE ANESTHETIST, CERTIFIED REGISTERED

## 2020-11-12 PROCEDURE — 761N000007 HC RECOVERY PHASE 2 EACH 15 MINS: Performed by: SURGERY

## 2020-11-12 PROCEDURE — 272N000001 HC OR GENERAL SUPPLY STERILE: Performed by: SURGERY

## 2020-11-12 PROCEDURE — 250N000009 HC RX 250: Performed by: SURGERY

## 2020-11-12 PROCEDURE — 250N000001 HC DESFLURANE, EA 15 MIN: Performed by: SURGERY

## 2020-11-12 PROCEDURE — 370N000002 HC ANESTHESIA TECHNICAL FEE, EACH ADDTL 15 MIN: Performed by: SURGERY

## 2020-11-12 PROCEDURE — 761N000003 HC RECOVERY PHASE 1 LEVEL 2 FIRST HR: Performed by: SURGERY

## 2020-11-12 PROCEDURE — 250N000009 HC RX 250: Performed by: NURSE ANESTHETIST, CERTIFIED REGISTERED

## 2020-11-12 PROCEDURE — 250N000011 HC RX IP 250 OP 636: Performed by: SURGERY

## 2020-11-12 PROCEDURE — 360N000020 HC SURGERY LEVEL 3 1ST 30 MIN: Performed by: SURGERY

## 2020-11-12 PROCEDURE — 250N000013 HC RX MED GY IP 250 OP 250 PS 637: Performed by: SURGERY

## 2020-11-12 PROCEDURE — 360N000021 HC SURGERY LEVEL 3 EA 15 ADDTL MIN: Performed by: SURGERY

## 2020-11-12 PROCEDURE — 47562 LAPAROSCOPIC CHOLECYSTECTOMY: CPT | Performed by: SURGERY

## 2020-11-12 PROCEDURE — 250N000011 HC RX IP 250 OP 636: Performed by: NURSE ANESTHETIST, CERTIFIED REGISTERED

## 2020-11-12 PROCEDURE — 84702 CHORIONIC GONADOTROPIN TEST: CPT | Performed by: SURGERY

## 2020-11-12 PROCEDURE — 88304 TISSUE EXAM BY PATHOLOGIST: CPT | Mod: 26 | Performed by: PATHOLOGY

## 2020-11-12 PROCEDURE — 370N000001 HC ANESTHESIA TECHNICAL FEE, 1ST 30 MIN: Performed by: SURGERY

## 2020-11-12 PROCEDURE — 999N000136 HC STATISTIC PRE PROC ASSESS II: Performed by: SURGERY

## 2020-11-12 PROCEDURE — 88304 TISSUE EXAM BY PATHOLOGIST: CPT | Mod: TC | Performed by: SURGERY

## 2020-11-12 RX ORDER — ONDANSETRON 4 MG/1
4 TABLET, ORALLY DISINTEGRATING ORAL EVERY 30 MIN PRN
Status: DISCONTINUED | OUTPATIENT
Start: 2020-11-12 | End: 2020-11-12 | Stop reason: HOSPADM

## 2020-11-12 RX ORDER — FENTANYL CITRATE 50 UG/ML
25-50 INJECTION, SOLUTION INTRAMUSCULAR; INTRAVENOUS
Status: DISCONTINUED | OUTPATIENT
Start: 2020-11-12 | End: 2020-11-12 | Stop reason: HOSPADM

## 2020-11-12 RX ORDER — LIDOCAINE 40 MG/G
CREAM TOPICAL
Status: DISCONTINUED | OUTPATIENT
Start: 2020-11-12 | End: 2020-11-12 | Stop reason: HOSPADM

## 2020-11-12 RX ORDER — MEPERIDINE HYDROCHLORIDE 25 MG/ML
12.5 INJECTION INTRAMUSCULAR; INTRAVENOUS; SUBCUTANEOUS
Status: DISCONTINUED | OUTPATIENT
Start: 2020-11-12 | End: 2020-11-12 | Stop reason: HOSPADM

## 2020-11-12 RX ORDER — DIPHENHYDRAMINE HYDROCHLORIDE 50 MG/ML
INJECTION INTRAMUSCULAR; INTRAVENOUS PRN
Status: DISCONTINUED | OUTPATIENT
Start: 2020-11-12 | End: 2020-11-12

## 2020-11-12 RX ORDER — PROPOFOL 10 MG/ML
INJECTION, EMULSION INTRAVENOUS CONTINUOUS PRN
Status: DISCONTINUED | OUTPATIENT
Start: 2020-11-12 | End: 2020-11-12

## 2020-11-12 RX ORDER — HEPARIN SODIUM 5000 [USP'U]/.5ML
5000 INJECTION, SOLUTION INTRAVENOUS; SUBCUTANEOUS
Status: COMPLETED | OUTPATIENT
Start: 2020-11-12 | End: 2020-11-12

## 2020-11-12 RX ORDER — PROPOFOL 10 MG/ML
INJECTION, EMULSION INTRAVENOUS PRN
Status: DISCONTINUED | OUTPATIENT
Start: 2020-11-12 | End: 2020-11-12

## 2020-11-12 RX ORDER — HYDROMORPHONE HYDROCHLORIDE 1 MG/ML
.3-.5 INJECTION, SOLUTION INTRAMUSCULAR; INTRAVENOUS; SUBCUTANEOUS EVERY 10 MIN PRN
Status: DISCONTINUED | OUTPATIENT
Start: 2020-11-12 | End: 2020-11-12 | Stop reason: HOSPADM

## 2020-11-12 RX ORDER — KETOROLAC TROMETHAMINE 10 MG/1
10 TABLET, FILM COATED ORAL EVERY 6 HOURS PRN
Qty: 30 TABLET | Refills: 0 | Status: SHIPPED | OUTPATIENT
Start: 2020-11-12 | End: 2021-07-13

## 2020-11-12 RX ORDER — CEFAZOLIN SODIUM 1 G/3ML
1 INJECTION, POWDER, FOR SOLUTION INTRAMUSCULAR; INTRAVENOUS SEE ADMIN INSTRUCTIONS
Status: DISCONTINUED | OUTPATIENT
Start: 2020-11-12 | End: 2020-11-12 | Stop reason: HOSPADM

## 2020-11-12 RX ORDER — NALOXONE HYDROCHLORIDE 0.4 MG/ML
.1-.4 INJECTION, SOLUTION INTRAMUSCULAR; INTRAVENOUS; SUBCUTANEOUS
Status: DISCONTINUED | OUTPATIENT
Start: 2020-11-12 | End: 2020-11-12 | Stop reason: HOSPADM

## 2020-11-12 RX ORDER — DEXAMETHASONE SODIUM PHOSPHATE 10 MG/ML
INJECTION, SOLUTION INTRAMUSCULAR; INTRAVENOUS PRN
Status: DISCONTINUED | OUTPATIENT
Start: 2020-11-12 | End: 2020-11-12

## 2020-11-12 RX ORDER — SODIUM CHLORIDE, SODIUM LACTATE, POTASSIUM CHLORIDE, CALCIUM CHLORIDE 600; 310; 30; 20 MG/100ML; MG/100ML; MG/100ML; MG/100ML
INJECTION, SOLUTION INTRAVENOUS CONTINUOUS
Status: DISCONTINUED | OUTPATIENT
Start: 2020-11-12 | End: 2020-11-12 | Stop reason: HOSPADM

## 2020-11-12 RX ORDER — KETOROLAC TROMETHAMINE 30 MG/ML
INJECTION, SOLUTION INTRAMUSCULAR; INTRAVENOUS PRN
Status: DISCONTINUED | OUTPATIENT
Start: 2020-11-12 | End: 2020-11-12

## 2020-11-12 RX ORDER — CEFAZOLIN SODIUM 2 G/100ML
2 INJECTION, SOLUTION INTRAVENOUS
Status: COMPLETED | OUTPATIENT
Start: 2020-11-12 | End: 2020-11-12

## 2020-11-12 RX ORDER — LIDOCAINE HYDROCHLORIDE 20 MG/ML
INJECTION, SOLUTION INFILTRATION; PERINEURAL PRN
Status: DISCONTINUED | OUTPATIENT
Start: 2020-11-12 | End: 2020-11-12

## 2020-11-12 RX ORDER — BUPIVACAINE HYDROCHLORIDE AND EPINEPHRINE 2.5; 5 MG/ML; UG/ML
INJECTION, SOLUTION INFILTRATION; PERINEURAL PRN
Status: DISCONTINUED | OUTPATIENT
Start: 2020-11-12 | End: 2020-11-12 | Stop reason: HOSPADM

## 2020-11-12 RX ORDER — ONDANSETRON 2 MG/ML
INJECTION INTRAMUSCULAR; INTRAVENOUS PRN
Status: DISCONTINUED | OUTPATIENT
Start: 2020-11-12 | End: 2020-11-12

## 2020-11-12 RX ORDER — FENTANYL CITRATE 50 UG/ML
INJECTION, SOLUTION INTRAMUSCULAR; INTRAVENOUS PRN
Status: DISCONTINUED | OUTPATIENT
Start: 2020-11-12 | End: 2020-11-12

## 2020-11-12 RX ORDER — ONDANSETRON 2 MG/ML
4 INJECTION INTRAMUSCULAR; INTRAVENOUS EVERY 30 MIN PRN
Status: DISCONTINUED | OUTPATIENT
Start: 2020-11-12 | End: 2020-11-12 | Stop reason: HOSPADM

## 2020-11-12 RX ADMIN — DEXAMETHASONE SODIUM PHOSPHATE 10 MG: 10 INJECTION, SOLUTION INTRAMUSCULAR; INTRAVENOUS at 08:33

## 2020-11-12 RX ADMIN — FENTANYL CITRATE 50 MCG: 50 INJECTION, SOLUTION INTRAMUSCULAR; INTRAVENOUS at 08:10

## 2020-11-12 RX ADMIN — SODIUM CHLORIDE, POTASSIUM CHLORIDE, SODIUM LACTATE AND CALCIUM CHLORIDE: 600; 310; 30; 20 INJECTION, SOLUTION INTRAVENOUS at 09:47

## 2020-11-12 RX ADMIN — SUGAMMADEX 150 MG: 100 INJECTION, SOLUTION INTRAVENOUS at 09:03

## 2020-11-12 RX ADMIN — CEFAZOLIN SODIUM 2 G: 2 INJECTION, SOLUTION INTRAVENOUS at 08:25

## 2020-11-12 RX ADMIN — ROCURONIUM BROMIDE 10 MG: 10 INJECTION INTRAVENOUS at 08:41

## 2020-11-12 RX ADMIN — ROCURONIUM BROMIDE 40 MG: 10 INJECTION INTRAVENOUS at 08:18

## 2020-11-12 RX ADMIN — PROPOFOL 75 MCG/KG/MIN: 10 INJECTION, EMULSION INTRAVENOUS at 08:26

## 2020-11-12 RX ADMIN — HEPARIN SODIUM 5000 UNITS: 10000 INJECTION, SOLUTION INTRAVENOUS; SUBCUTANEOUS at 08:27

## 2020-11-12 RX ADMIN — ACETAMINOPHEN AND CODEINE PHOSPHATE 1 TABLET: 300; 30 TABLET ORAL at 10:36

## 2020-11-12 RX ADMIN — SODIUM CHLORIDE, POTASSIUM CHLORIDE, SODIUM LACTATE AND CALCIUM CHLORIDE: 600; 310; 30; 20 INJECTION, SOLUTION INTRAVENOUS at 08:10

## 2020-11-12 RX ADMIN — KETOROLAC TROMETHAMINE 30 MG: 30 INJECTION, SOLUTION INTRAMUSCULAR at 09:10

## 2020-11-12 RX ADMIN — MIDAZOLAM 2 MG: 1 INJECTION INTRAMUSCULAR; INTRAVENOUS at 08:10

## 2020-11-12 RX ADMIN — LIDOCAINE HYDROCHLORIDE 60 MG: 20 INJECTION, SOLUTION INFILTRATION; PERINEURAL at 08:18

## 2020-11-12 RX ADMIN — DIPHENHYDRAMINE HYDROCHLORIDE 25 MG: 50 INJECTION, SOLUTION INTRAMUSCULAR; INTRAVENOUS at 08:33

## 2020-11-12 RX ADMIN — PROPOFOL 150 MG: 10 INJECTION, EMULSION INTRAVENOUS at 08:18

## 2020-11-12 RX ADMIN — ONDANSETRON 4 MG: 2 INJECTION INTRAMUSCULAR; INTRAVENOUS at 09:04

## 2020-11-12 RX ADMIN — FENTANYL CITRATE 50 MCG: 50 INJECTION, SOLUTION INTRAMUSCULAR; INTRAVENOUS at 08:35

## 2020-11-12 ASSESSMENT — MIFFLIN-ST. JEOR: SCORE: 1359.47

## 2020-11-12 NOTE — DISCHARGE INSTRUCTIONS
[facility]    Home Care Following Gallbladder Surgery    [facility]    Pain meds:     10mg Toradol every 6hrs prn , make sure to take this with food    Tylenol #3 every 6 hrs prn; or you can take OTC tylenol     You can alternate these meds so that you take one every 3 hrs.      Make sure you do not go over 4000mg of acetaminophen every 24hrs, especially if you are taking Tylenol #3.    Care of the Incision:    Remove gauze dressing (if present) after 48 hours.    Leave small strips in place (if present).  They will gradually come off.    If surgical glue was used on your incision, keep it dry for 24 hours.  Then you may shower but don t submerge under water for at least 2 weeks.  Gently pat your incision dry with a freshly laundered towel.    Do not touch your incision with bare hands or pick at scabs.    Leave your incision open to air.  Cover it only if draining, clothing rubs or irritates it.    Activity:    Gradually increase your activity.  Walk short distances several times each day and increase the distance as your strength allows.    To promote circulation, do not cross your legs while sitting.    No strenuous lifting or straining for 2-3 weeks.  Do not lift anything over 10-20 pounds until your doctor approves an increase.    Return to work will be determined by the type of work you do and should be discussed with your physician.    Do not drive or operate equipment while taking prescription pain medicines.  You may drive 1 week after surgery if you have stopped taking prescription pain medicines and can react quickly enough to make an emergency stop if necessary.    Diet:    Return to the diet you were on before surgery.    Drink plenty of water.    Avoid foods that cause constipation.      REMEMBER--most prescription pain pills cause constipation.  Walking, extra fluids, and increased fiber (fresh fruits and vegetables, etc.) are natural remedies for constipation.  You can also take  mineral oil, 1-2 Tablespoons per day.  If still constipated you may try a stool softener such as Colace or Miralax.    Call Your Physician if You Have:    Redness, increased swelling or cloudy drainage from your incision.    A temperature of more than 101 degrees F.    Worsening pain in your incision not relieved by your prescription pain pills and/or a short rest.    Jaundice (yellowing of skin or eyes)    Abdominal distention (stomach getting very large)    Swelling in your legs    Productive cough    Burning with urination    Any questions or concerns about your recovery, please call     Business hours [phone]    After hours [phone] Nurse Advice Line (24 hours a day)    Follow-up Care:    Make an appointment 2-3 week after your surgery.  Call [phone].    [facility] Same-Day [facility]   Adult Discharge Orders & Instructions     For 24 hours after surgery    1. Get plenty of rest.  A responsible adult must stay with you for at least 24 hours after you leave the hospital.   2. Do not drive or use heavy equipment.  If you have weakness or tingling, don't drive or use heavy equipment until this feeling goes away.  3. Do not drink alcohol.  4. Avoid strenuous or risky activities.  Ask for help when climbing stairs.   5. You may feel lightheaded.  If so, sit for a few minutes before standing.  Have someone help you get up.   6. You may have a slight fever. Call the doctor if your fever is over 100 F (37.7 C) (taken under the tongue) or lasts longer than 24 hours.  7. You may have a dry mouth, a sore throat, muscle aches or trouble sleeping.  These should go away after 24 hours.  8. Do not make important or legal decisions.  We don t expect you to have any problems from the surgery or treatment you had today. Just in case, here s what to do if you have pain, upset stomach (nausea), bleeding or infection:  Pain:  Take medicines your doctor has prescribed or over-the-counter medicine they have  suggested. Resting and using ice packs can help, too. For surgery on an arm or leg, raise it on a pillow to ease swelling. Call your doctor if these methods don t work.  Copyright Srinivas Bustamante, Licensed under CC4.0 [employer]  Upset stomach (nausea):  Take anti-nausea medicine approved by your doctor. Drink clear liquids like apple juice, ginger ale, broth or 7-Up. Be sure to drink enough fluids. Rest can help, too. Move to normal foods when you re ready  . Bleeding:  In the first 24 hours, you may see a little blood on your dressing, about the size of a quarter. You don t need to worry about this much blood, but if the blood spot keeps getting bigger:    Put pressure on the wound if you can, AND    Call your doctor.  Copyright [employer], Licensed under CC4.0 [employer]  Fever/Infection: Please call your doctor if you have any of these signs:    Redness    Swelling    Wound feels warm    Pain gets worse    Bad-smelling fluid leaks from wound    Fever or chills  Call your doctor for any of the followin.  It has been over 8 to 10 hours since surgery and you are still not able to urinate (pass water).    2.  Headache for over 24 hours.    3.  Numbness, tingling or weakness in your legs the day after surgery (if you had spinal anesthesia).    Nurse advice line: [phone] (24 hour)    DO not take ibuprofen while taking toradol

## 2020-11-12 NOTE — ANESTHESIA CARE TRANSFER NOTE
Patient: Rosemarie Wade    Procedure(s):  Laparoscopic cholecystectomy    Diagnosis: Biliary colic symptom [K80.50]  Chronic cholecystitis with calculus [K80.10]  Diagnosis Additional Information: No value filed.    Anesthesia Type:   General     Note:  Airway :Face Mask  Patient transferred to:PACU  Handoff Report: Identifed the Patient, Identified the Reponsible Provider, Reviewed the pertinent medical history, Discussed the surgical course, Reviewed Intra-OP anesthesia mangement and issues during anesthesia, Set expectations for post-procedure period and Allowed opportunity for questions and acknowledgement of understanding      Vitals: (Last set prior to Anesthesia Care Transfer)    CRNA VITALS  11/12/2020 0849 - 11/12/2020 0924      11/12/2020             Pulse:  92    SpO2:  99 %    Resp Rate (observed):  (!) 2                Electronically Signed By: HILDA Palma CRNA  November 12, 2020  9:24 AM

## 2020-11-12 NOTE — ANESTHESIA POSTPROCEDURE EVALUATION
Patient: Rosemarie Wade    Procedure(s):  Laparoscopic cholecystectomy    Diagnosis:Biliary colic symptom [K80.50]  Chronic cholecystitis with calculus [K80.10]  Diagnosis Additional Information: No value filed.    Anesthesia Type:  General    Note:  Anesthesia Post Evaluation    Patient location during evaluation: Phase 2  Patient participation: Able to fully participate in evaluation  Level of consciousness: awake and alert  Pain management: adequate  Airway patency: patent  Cardiovascular status: acceptable  Respiratory status: acceptable  Hydration status: acceptable  PONV: none     Anesthetic complications: None    Comments: Patient was pleased with her care today. She is sitting up in bed eating a snack. She denies pain or nausea. No complications noted. Will follow as needed.        Last vitals:  Vitals:    11/12/20 1045 11/12/20 1100 11/12/20 1115   BP: 118/70 118/81 100/66   Pulse: 73 70    Resp:  16 18   Temp: 99  F (37.2  C) 99  F (37.2  C)    SpO2: 100% 100%          Electronically Signed By: HILDA Palma CRNA  November 12, 2020  2:00 PM

## 2020-11-12 NOTE — ANESTHESIA PROCEDURE NOTES
Airway   Date/Time: 11/12/2020 8:22 AM   Patient location during procedure: OR    Staff -   Other Anesthesia Staff: Brenda Tiwari  Performed By: SRNA    Consent for Airway   Urgency: elective    Indications and Patient Condition  Indications for airway management: shania-procedural  Induction type:intravenousMask difficulty assessment: 1 - vent by mask    Final Airway Details  Final airway type: endotracheal airway  Successful airway:ETT - single and Oral  Endotracheal Airway Details   ETT size (mm): 6.5  Cuffed: yes  Successful intubation technique: direct laryngoscopy  Grade View of Cords: 1  Adjucts: stylet  Measured from: lips  Secured at (cm): 21  Secured with: cloth tape  Bite block used: None    Post intubation assessment   Placement verified by: capnometry, equal breath sounds and chest rise   Number of attempts at approach: 1  Secured with:cloth tape  Ease of procedure: easy  Dentition: Intact and Unchanged

## 2020-11-12 NOTE — ANESTHESIA PREPROCEDURE EVALUATION
Anesthesia Pre-Procedure Evaluation    Patient: Rosemarie Wade   MRN: [id number] : 1985          Preoperative Diagnosis: Biliary colic symptom [K80.50]  Chronic cholecystitis with calculus [K80.10]    Procedure(s):  Laparoscopic cholecystectomy, possible open    Past Medical History:   Diagnosis Date     NO ACTIVE PROBLEMS      Past Surgical History:   Procedure Laterality Date     HC TOOTH EXTRACTION W/FORCEP         Anesthesia Evaluation     . Pt has not had prior anesthetic            ROS/MED HX    ENT/Pulmonary:  - neg pulmonary ROS     Neurologic:  - neg neurologic ROS     Cardiovascular:     (+) Dyslipidemia, ----. : . . . :. . No previous cardiac testing       METS/Exercise Tolerance:  >4 METS   Hematologic:  - neg hematologic  ROS       Musculoskeletal:  - neg musculoskeletal ROS       GI/Hepatic:     (+) GERD Asymptomatic on medication, cholecystitis/cholelithiasis,       Renal/Genitourinary:  - ROS Renal section negative   (+) Pt has no history of transplant,       Endo:  - neg endo ROS       Psychiatric:  - neg psychiatric ROS       Infectious Disease:  - neg infectious disease ROS       Malignancy:      - no malignancy   Other:    (+) no H/O Chronic Pain,  - neg other ROS                      Physical Exam  Normal systems: cardiovascular, pulmonary and dental    Airway   Mallampati: I  TM distance: >3 FB  Neck ROM: full    Dental     Cardiovascular   Rhythm and rate: regular and normal      Pulmonary             Lab Results   Component Value Date    WBC 8.5 2020    HGB 13.5 2020    HCT 40.6 2020     2020     2020    POTASSIUM 3.8 2020    CHLORIDE 105 2020    CO2 23 2020    BUN 13 2020    CR 0.70 2020     (H) 2020    FREDERIC 9.2 2020    ALBUMIN 3.8 2020    PROTTOTAL 7.4 2020    ALT 19 2020    AST 13 2020    ALKPHOS 51 2020    BILITOTAL 0.5 2020    LIPASE 104 2020     "TSH 1.35 12/22/2014    HCG Negative 03/24/2020       Preop Vitals  BP Readings from Last 3 Encounters:   11/09/20 110/80   10/23/20 122/64   10/09/20 122/60    Pulse Readings from Last 3 Encounters:   11/09/20 90   10/09/20 87   07/25/19 77      Resp Readings from Last 3 Encounters:   11/09/20 14   10/09/20 12   03/24/20 14    SpO2 Readings from Last 3 Encounters:   11/09/20 99%   10/09/20 100%   03/24/20 100%      Temp Readings from Last 1 Encounters:   11/09/20 97.4  F (36.3  C) (Temporal)    Ht Readings from Last 1 Encounters:   10/23/20 1.69 m (5' 6.54\")      Wt Readings from Last 1 Encounters:   11/09/20 65.8 kg (145 lb)    Estimated body mass index is 23.03 kg/m  as calculated from the following:    Height as of 10/23/20: 1.69 m (5' 6.54\").    Weight as of 11/9/20: 65.8 kg (145 lb).       Anesthesia Plan      History & Physical Review  History and physical reviewed and following examination; no interval change.    ASA Status:  1 .    NPO Status:  > 8 hours    Plan for General with Intravenous and Propofol induction. Maintenance will be Balanced.    PONV prophylaxis:  Ondansetron (or other 5HT-3) and Dexamethasone or Solumedrol    The patient is not a current smoker      Postoperative Care  Postoperative pain management:  IV analgesics and Oral pain medications.      Consents  Anesthetic plan, risks, benefits and alternatives discussed with:  Patient.  Use of blood products discussed: No .   .                 Brenda Arroyo  "

## 2020-11-12 NOTE — INTERVAL H&P NOTE
The History and Physical has been reviewed, the patient has been examined and no changes have occurred in the patient's condition since the H & P was completed.     [facility]

## 2020-11-12 NOTE — OP NOTE
OPERATIVE NOTE  [facility] SURGERY    DATE:  11/12/2020    SURGEON:  Juan Francisco Romo DO    ASSISTANT:  MS3    PREOPERATIVE DIAGNOSIS:  Biliary colic symptom [K80.50]  Chronic cholecystitis with calculus [K80.10]    POSTOPERATIVE DIAGNOSIS:  same    OPERATION:  Laparoscopic cholecystectomy .    ANESTHESIA:  General endotracheal.    INDICATIONS FOR PROCEDURE:  The patient is a 35 year old year old female with recurrent biliary colic 2/2 chronic calculus cholecystitis.  Based on this, laparoscopic cholecystectomy was recommended.    FINDINGS:  Chronic calculus cholecystitis    PROCEDURE IN DETAIL: The patient was preoperatively identified. Consent was signed and placed on the chart. She/he was brought back to the operative suite where he was laid supine on the operating table. General endotracheal anesthesia was induced per anesthesia protocol. She/he was then prepped and draped in sterile fashion. We started by infiltrating 5 cc of local anesthetic in the right upper quadrant area and made small skin incision and accessing the abdominal cavity by using Optiview trocar and 5-mm trocar site visualizing all layers of the abdominal wall until we reached the peritoneal cavity. We then insufflated  with CO2 gas to create pneumoperitoneum. A second right subcostal port was inserted, also 5 mm, as well as subxiphoid at supraumbilical site. All were 5 mm ports except the subxiphoid. All were inserted under direct visualization and all sites were preanesthetized with local anesthetic prior to the insertion of the trocar. We then grabbed the fundus of the gallbladder, retracted it anteriorly and cephalad. There were large number of adhesions to the greater omentum and the gallbladder. These were bluntly taken down. After we had successfully taken down these adhesions, we then grabbed the neck of the gallbladder, retracted it laterally, and undertook dissection of the Calot's triangle. We identified the duct and artery,  skeletonized the structures, clipped them proximally and distally, and ligated them with EndoShears. The gallbladder was then dissected from the liver bed using electrocautery. The gallbladder was retrieved through the epigastic port, and the trochar replaced. Once pneumoperitoneum was reestablished, the gallbladder bed was inspected for bleeding and bile leak and neither were found. The patient was positioned flat, irrigant solution was suctioned free, and pneumoperitoneum was relieved before removing the trocars.  Hemostasis was noted to be excellent at the conclusion of the case. We closed the 12mm trocar transfascially with a 0-vicryl on a UR6.All irrigant that could be visualized was removed from the abdomen. We then desufflated the abdomen, reduced pneumoperitoneum, and removed the trocars under direct visualization. We then undertook skin closure with interrupted Monocryl sutures in subcuticular fashion. The patient tolerated the procedure well and was brought to PACU in stable condition.        COMPLICATIONS: None.    ESTIMATED BLOOD LOSS: 2cc    SPECIMENS: Gallbladder.    DISPOSITION: Stable to PACU with anticipated discharge home later today.    [facility], DO

## 2020-11-13 LAB — COPATH REPORT: NORMAL

## 2020-11-13 NOTE — OR NURSING
"[facility] Same Day Surgery  Discharge Call Back  Rosemarie Wade  1985  MRN: [id number]  Home: [phone] (home)   PCP: Maryse Washington    We are calling to see how you're doing since your surgery/procedure with us?   Comments: \"I'm doing really well.\"  Clinical Questions  1. Have you had time to look at your discharge instructions? Do you have any questions in regards to the instructions?   Comment: yes/no  2. Do you feel your pain is being controlled with the regimen the surgeon sent you home on? (ie: prescription medications, over the counter pain medications, ice packs)   Comments: yes  3. Have you noticed any drainage on your dressing? Do you know what to do if you have bleeding as a result of your procedure?   Comments: no/yes  4. Have you had any nausea/vomiting? Do you know how to treat this?   Comment: yes,\"when I first arrived home but it resolved on it's own\"/yes  5. Have you had any signs/symptoms of infection? (ie: fever, swelling, heat, drainage or redness) Do you know what to do if you have?   Comment: no/yes  6. Do you have a follow up appointment made with your surgeon? Do you have a number to contact them at if you need it?   Comment: yes/yes  Retained Foreign Object (SUNITA, Hemovac, Penrose, Wound Packing, Vaginal Packing, Nasal Splints, Urethral Stents, Spence Catheter)  1. Do you still have NA in place?   2. If the item is still in place, can you review the plan for removal with me? NA      You may be randomly selected to fill out a [facility] survey. We would appreciate you taking the time to fill this out. It is important to us if you would answer all of the questions on the survey.              "

## 2020-11-24 ENCOUNTER — VIRTUAL VISIT (OUTPATIENT)
Dept: SURGERY | Facility: CLINIC | Age: 35
End: 2020-11-24
Payer: COMMERCIAL

## 2020-11-24 DIAGNOSIS — Z90.49 S/P LAPAROSCOPIC CHOLECYSTECTOMY: Primary | ICD-10-CM

## 2020-11-24 PROCEDURE — 99024 POSTOP FOLLOW-UP VISIT: CPT | Performed by: SURGERY

## 2020-11-24 NOTE — LETTER
"    11/24/2020         RE: Rosemarie Wade  [address] [address] [address]        Dear Colleague,    Thank you for referring your patient, Rosemarie Wade, to the [facility]. Please see a copy of my visit note below.    Rosemarie Wade is a 35 year old female who is being evaluated via a billable telephone visit.      The patient has been notified of following:     \"This telephone visit will be conducted via a call between you and your physician/provider. We have found that certain health care needs can be provided without the need for a physical exam.  This service lets us provide the care you need with a short phone conversation.  If a prescription is necessary we can send it directly to your pharmacy.  If lab work is needed we can place an order for that and you can then stop by our lab to have the test done at a later time.    Telephone visits are billed at different rates depending on your insurance coverage. During this emergency period, for some insurers they may be billed the same as an in-person visit.  Please reach out to your insurance provider with any questions.    If during the course of the call the physician/provider feels a telephone visit is not appropriate, you will not be charged for this service.\"    Patient has given verbal consent for Telephone visit?  Yes    What phone number would you like to be contacted at? [phone]    How would you like to obtain your AVS? [facility] FOLLOW-UP NOTE  GENERAL SURGERY    PCP: Maryse Washington         Assessment and Plan:   Assessment:   Rosemarie Wade is a 35 year old female who presented post operatively from lap stephanie 11/12/2020 and is doing well.       ICD-10-CM    1. S/P laparoscopic cholecystectomy  Z90.49        I reviewed the pathology report today with the patient and answered all questions.  SPECIMEN(S):   Gallbladder and contents     FINAL DIAGNOSIS:   Gallbladder, cholecystectomy: "   -Chronic cholecystitis with cholelithiasis   -Benign regional lymph node     Plan:  -reassured pt diarrhea will improve.  No limiting more than 15 lbs for 4-6 weeks from DOS.  All questions answered.           Subjective:     Rosemarie Wade is a 35 year old female who presents post operatively from  lap stephanie 11/12/2020. Pain has been controled. Currently is not using pain medications. Eating a Regular and having regular bladder/bowel function. Overall doing very well.           Objective:     LMP 11/06/2020    Constitutional: Awake, alert, in no acute distress.  Respiratory: Non-labored.   Abdomen: pt reported incisions healing well.      [facility] Hospital[facility]ina DO Clarissa  [facility]      Phone call duration: 8 minutes            Again, thank you for allowing me to participate in the care of your patient.        Sincerely,        BarWes Romo MD

## 2020-11-24 NOTE — PROGRESS NOTES
"Rosemarie Wade is a 35 year old female who is being evaluated via a billable telephone visit.      The patient has been notified of following:     \"This telephone visit will be conducted via a call between you and your physician/provider. We have found that certain health care needs can be provided without the need for a physical exam.  This service lets us provide the care you need with a short phone conversation.  If a prescription is necessary we can send it directly to your pharmacy.  If lab work is needed we can place an order for that and you can then stop by our lab to have the test done at a later time.    Telephone visits are billed at different rates depending on your insurance coverage. During this emergency period, for some insurers they may be billed the same as an in-person visit.  Please reach out to your insurance provider with any questions.    If during the course of the call the physician/provider feels a telephone visit is not appropriate, you will not be charged for this service.\"    Patient has given verbal consent for Telephone visit?  Yes    What phone number would you like to be contacted at? [phone]    How would you like to obtain your AVS? [facility] FOLLOW-UP NOTE  GENERAL SURGERY    PCP: Maryse Washington         Assessment and Plan:   Assessment:   Rosemarie Wade is a 35 year old female who presented post operatively from lap stephanie 11/12/2020 and is doing well.       ICD-10-CM    1. S/P laparoscopic cholecystectomy  Z90.49        I reviewed the pathology report today with the patient and answered all questions.  SPECIMEN(S):   Gallbladder and contents     FINAL DIAGNOSIS:   Gallbladder, cholecystectomy:   -Chronic cholecystitis with cholelithiasis   -Benign regional lymph node     Plan:  -reassured pt diarrhea will improve.  No limiting more than 15 lbs for 4-6 weeks from DOS.  All questions answered.           Subjective:     Rosemarie Wade is a 35 year " old female who presents post operatively from  lap stephanie 11/12/2020. Pain has been controled. Currently is not using pain medications. Eating a Regular and having regular bladder/bowel function. Overall doing very well.           Objective:     LMP 11/06/2020    Constitutional: Awake, alert, in no acute distress.  Respiratory: Non-labored.   Abdomen: pt reported incisions healing well.      [facility]  [facility]      Phone call duration: 8 minutes

## 2021-01-09 ENCOUNTER — HEALTH MAINTENANCE LETTER (OUTPATIENT)
Age: 36
End: 2021-01-09

## 2021-02-10 ENCOUNTER — MYC REFILL (OUTPATIENT)
Dept: SURGERY | Facility: CLINIC | Age: 36
End: 2021-02-10

## 2021-02-10 DIAGNOSIS — R19.7 DIARRHEA, UNSPECIFIED TYPE: ICD-10-CM

## 2021-02-10 DIAGNOSIS — Z90.49 S/P LAPAROSCOPIC CHOLECYSTECTOMY: ICD-10-CM

## 2021-02-11 RX ORDER — CHOLESTYRAMINE 4 G/9G
1 POWDER, FOR SUSPENSION ORAL 2 TIMES DAILY WITH MEALS
Qty: 60 PACKET | Refills: 0 | Status: SHIPPED | OUTPATIENT
Start: 2021-02-11 | End: 2021-04-30

## 2021-02-11 NOTE — TELEPHONE ENCOUNTER
"    Requested Prescriptions   Pending Prescriptions Disp Refills     cholestyramine (QUESTRAN) 4 g packet 60 packet 0     Sig: Take 1 packet (4 g) by mouth 2 times daily (with meals)       Bile Acid Sequestrant Agents Failed - 2/10/2021  5:57 PM        Failed - Lipid panel on file in past 12 mos     Recent Labs   Lab Test 04/19/19  0834 08/23/13  0942 08/23/13  0942   CHOL 223*   < > 233*   TRIG 115   < > 134   HDL 57   < > 45*   *   < > 162*   NHDL 166*   < >  --    VLDL  --   --  27   CHOLHDLRATIO  --   --  5.0    < > = values in this interval not displayed.               Passed - Recent (12 mo) or future (30 days) visit within the authorizing provider's specialty     Patient has had an office visit with the authorizing provider or a provider within the authorizing providers department within the previous 12 mos or has a future within next 30 days. See \"Patient Info\" tab in inbasket, or \"Choose Columns\" in Meds & Orders section of the refill encounter.              Passed - Medication is active on med list        Passed - Patient is age 18 years or older        Passed - No active pregnancy on record        Passed - No positive pregnancy test in past 12 months             Unable to fill this medication using the protocols.  Routing to prescribing provider to address.       Kalee MARTINEZ Lead RN, BSN. . .  2/11/2021, 8:24 AM    "

## 2021-04-30 ENCOUNTER — MYC REFILL (OUTPATIENT)
Dept: SURGERY | Facility: CLINIC | Age: 36
End: 2021-04-30

## 2021-04-30 DIAGNOSIS — Z90.49 S/P LAPAROSCOPIC CHOLECYSTECTOMY: ICD-10-CM

## 2021-04-30 DIAGNOSIS — R19.7 DIARRHEA, UNSPECIFIED TYPE: ICD-10-CM

## 2021-04-30 RX ORDER — CHOLESTYRAMINE 4 G/9G
1 POWDER, FOR SUSPENSION ORAL 2 TIMES DAILY WITH MEALS
Qty: 60 PACKET | Refills: 0 | Status: SHIPPED | OUTPATIENT
Start: 2021-04-30 | End: 2022-01-03

## 2021-04-30 NOTE — TELEPHONE ENCOUNTER
"    Requested Prescriptions   Pending Prescriptions Disp Refills     cholestyramine (QUESTRAN) 4 g packet 60 packet 0     Sig: Take 1 packet (4 g) by mouth 2 times daily (with meals)       Bile Acid Sequestrant Agents Failed - 4/30/2021 11:58 AM        Failed - Lipid panel on file in past 12 mos     Recent Labs   Lab Test 04/19/19  0834 08/23/13  0942 08/23/13  0942   CHOL 223*   < > 233*   TRIG 115   < > 134   HDL 57   < > 45*   *   < > 162*   NHDL 166*   < >  --    VLDL  --   --  27   CHOLHDLRATIO  --   --  5.0    < > = values in this interval not displayed.               Passed - Recent (12 mo) or future (30 days) visit within the authorizing provider's specialty     Patient has had an office visit with the authorizing provider or a provider within the authorizing providers department within the previous 12 mos or has a future within next 30 days. See \"Patient Info\" tab in inbasket, or \"Choose Columns\" in Meds & Orders section of the refill encounter.              Passed - Medication is active on med list        Passed - Patient is age 18 years or older        Passed - No active pregnancy on record        Passed - No positive pregnancy test in past 12 months             Unable to fill this medication using the protocols.  Routing to prescribing provider to address.       Kalee MARTINEZ Lead RN, BSN. . .  4/30/2021, 2:26 PM    "

## 2021-07-13 ENCOUNTER — OFFICE VISIT (OUTPATIENT)
Dept: FAMILY MEDICINE | Facility: CLINIC | Age: 36
End: 2021-07-13
Payer: COMMERCIAL

## 2021-07-13 VITALS
TEMPERATURE: 97.8 F | BODY MASS INDEX: 24.15 KG/M2 | WEIGHT: 149.6 LBS | RESPIRATION RATE: 20 BRPM | HEART RATE: 105 BPM | SYSTOLIC BLOOD PRESSURE: 102 MMHG | OXYGEN SATURATION: 99 % | DIASTOLIC BLOOD PRESSURE: 82 MMHG

## 2021-07-13 DIAGNOSIS — R10.84 ABDOMINAL PAIN, GENERALIZED: ICD-10-CM

## 2021-07-13 DIAGNOSIS — K21.9 GASTROESOPHAGEAL REFLUX DISEASE WITHOUT ESOPHAGITIS: ICD-10-CM

## 2021-07-13 DIAGNOSIS — K52.9 CHRONIC DIARRHEA: Primary | ICD-10-CM

## 2021-07-13 LAB
ALBUMIN SERPL-MCNC: 3.9 G/DL (ref 3.4–5)
ALP SERPL-CCNC: 43 U/L (ref 40–150)
ALT SERPL W P-5'-P-CCNC: 21 U/L (ref 0–50)
ANION GAP SERPL CALCULATED.3IONS-SCNC: 5 MMOL/L (ref 3–14)
AST SERPL W P-5'-P-CCNC: 13 U/L (ref 0–45)
BILIRUB SERPL-MCNC: 0.5 MG/DL (ref 0.2–1.3)
BUN SERPL-MCNC: 11 MG/DL (ref 7–30)
CALCIUM SERPL-MCNC: 8.8 MG/DL (ref 8.5–10.1)
CHLORIDE BLD-SCNC: 108 MMOL/L (ref 94–109)
CO2 SERPL-SCNC: 25 MMOL/L (ref 20–32)
CREAT SERPL-MCNC: 0.83 MG/DL (ref 0.52–1.04)
CRP SERPL-MCNC: <2.9 MG/L (ref 0–8)
ERYTHROCYTE [SEDIMENTATION RATE] IN BLOOD BY WESTERGREN METHOD: 8 MM/HR (ref 0–20)
GFR SERPL CREATININE-BSD FRML MDRD: 90 ML/MIN/1.73M2
GLUCOSE BLD-MCNC: 91 MG/DL (ref 70–99)
POTASSIUM BLD-SCNC: 3.7 MMOL/L (ref 3.4–5.3)
PROT SERPL-MCNC: 7.7 G/DL (ref 6.8–8.8)
SODIUM SERPL-SCNC: 138 MMOL/L (ref 133–144)
TSH SERPL DL<=0.005 MIU/L-ACNC: 1.59 MU/L (ref 0.4–4)

## 2021-07-13 PROCEDURE — 85652 RBC SED RATE AUTOMATED: CPT | Performed by: FAMILY MEDICINE

## 2021-07-13 PROCEDURE — 90632 HEPA VACCINE ADULT IM: CPT | Performed by: FAMILY MEDICINE

## 2021-07-13 PROCEDURE — 86140 C-REACTIVE PROTEIN: CPT | Performed by: FAMILY MEDICINE

## 2021-07-13 PROCEDURE — 80053 COMPREHEN METABOLIC PANEL: CPT | Performed by: FAMILY MEDICINE

## 2021-07-13 PROCEDURE — 90471 IMMUNIZATION ADMIN: CPT | Performed by: FAMILY MEDICINE

## 2021-07-13 PROCEDURE — 84443 ASSAY THYROID STIM HORMONE: CPT | Performed by: FAMILY MEDICINE

## 2021-07-13 PROCEDURE — 83516 IMMUNOASSAY NONANTIBODY: CPT | Performed by: FAMILY MEDICINE

## 2021-07-13 PROCEDURE — 36415 COLL VENOUS BLD VENIPUNCTURE: CPT | Performed by: FAMILY MEDICINE

## 2021-07-13 PROCEDURE — 99214 OFFICE O/P EST MOD 30 MIN: CPT | Mod: 25 | Performed by: FAMILY MEDICINE

## 2021-07-13 RX ORDER — BISMUTH SUBSALICYLATE 262 MG/1
1 TABLET, CHEWABLE ORAL
COMMUNITY

## 2021-07-13 ASSESSMENT — PAIN SCALES - GENERAL: PAINLEVEL: MILD PAIN (3)

## 2021-07-13 NOTE — PROGRESS NOTES
Assessment & Plan       ICD-10-CM    1. Chronic diarrhea  K52.9 Comprehensive metabolic panel (BMP + Alb, Alk Phos, ALT, AST, Total. Bili, TP)     TSH with free T4 reflex     ESR: Erythrocyte sedimentation rate     CRP, inflammation     Tissue transglutaminase stuart IgA and IgG     Adult Gastro Ref - Procedure Only     Adult Gastro Ref - Procedure Only     Tissue transglutaminase stuart IgA and IgG     CRP, inflammation     ESR: Erythrocyte sedimentation rate     TSH with free T4 reflex     Comprehensive metabolic panel (BMP + Alb, Alk Phos, ALT, AST, Total. Bili, TP)   2. Gastroesophageal reflux disease without esophagitis  K21.9 Comprehensive metabolic panel (BMP + Alb, Alk Phos, ALT, AST, Total. Bili, TP)     TSH with free T4 reflex     ESR: Erythrocyte sedimentation rate     CRP, inflammation     Tissue transglutaminase stuart IgA and IgG     Adult Gastro Ref - Procedure Only     Adult Gastro Ref - Procedure Only     Tissue transglutaminase stuart IgA and IgG     CRP, inflammation     ESR: Erythrocyte sedimentation rate     TSH with free T4 reflex     Comprehensive metabolic panel (BMP + Alb, Alk Phos, ALT, AST, Total. Bili, TP)   3. Abdominal pain, generalized  R10.84 Adult Gastro Ref - Procedure Only     Adult Gastro Ref - Procedure Only          We discussed possible causes of chronic diarrhea.  I am going to work her up with some lab work today to rule out hyperthyroidism, celiac disease, and also check a comprehensive metabolic panel to look at liver or kidney issues.  Her gallbladder is gone, so not likely the cause.  We will also check for inflammatory markers.  Because of the chronicity of her symptoms, I do recommend referral to GI for EGD and simultaneous colonoscopy followed by consultation to discuss results and further work-up.  I reviewed notes related to her gallbladder surgery.     Hepatitis A vaccine also given as her second dose was due.  We will notify her with lab work and process referral.    25  minutes spent on the date of the encounter doing chart review, history and exam, documentation and further activities per the note    Aileen Dominguez MD  [facility] ANA LAURA Grimaldo is a 35 year old who presents for the following health issues     HPI     Diarrhea  Onset/Duration: Ongoing   Description:       Consistency of stool: runny, loose and explosive       Blood in stool: no       Number of loose stools past 24 hours: 4  Progression of Symptoms: same and constant(daily)  Accompanying signs and symptoms:       Fever: no       Nausea/Vomiting: YES       Abdominal pain: YES       Weight loss: no       Episodes of constipation: no  History   Ill contacts: not applicable  Recent use of antibiotics: not applicable  Recent travels: not applicable  Recent medication-new or changes(Rx or OTC): not applicable  Precipitating or alleviating factors: nothing in particular   Therapies tried and outcome: Imodium AD and PeptoBismol    She states she has had GI issues her whole life, but this is gradually getting worse.   She has chronic diarrhea.  She wakes at night with stomach pain in her higher abdomen.  Lately she also has nausea and acid reflux.  She cannot eat away from home because of bowel issues.  She cannot sleep on her sides due to pain.  She has not had any emesis and she has not lost weight.  She has tried probiotics, digestive enzymes, food sensitivity diets, eliminating eggs, cow's milk, almonds and eating less gluten.  She has not completely gone gluten-free.  She has not had any blood in her stool except for 2 times in her entire life.  She is taking Questran once or twice a week but it causes her to feel nauseous and icky.    In the fall 2020 she was having gallbladder issues and she did end up having gallbladder surgery in November 2020 and her gallbladder is feeling better.    She is taking her OCPs without issue.       Review of Systems   Constitutional, HEENT,  cardiovascular, pulmonary, gi and gu systems are negative, except as otherwise noted.      Objective    /82   Pulse 105   Temp 97.8  F (36.6  C) (Temporal)   Resp 20   Wt 67.9 kg (149 lb 9.6 oz)   LMP 06/18/2021 (Approximate)   SpO2 99%   BMI 24.15 kg/m    Body mass index is 24.15 kg/m .  Physical Exam   Vitals noted.  Patient alert, oriented, and in no acute distress.   Neck:  Supple without lymphadenopathy, JVD or masses.   CV:  RRR without murmur.   Respiratory:  Lungs clear to auscultation bilaterally.   Abdomen:  Soft, nontender, nondistended with good bowel sounds and no masses or hepatosplenomegaly.   Extremities warm and dry without cyanosis, clubbing or edema.     Orders Placed This Encounter   Procedures     HEPATITIS A VACCINE (ADULT)     Comprehensive metabolic panel (BMP + Alb, Alk Phos, ALT, AST, Total. Bili, TP)     TSH with free T4 reflex     ESR: Erythrocyte sedimentation rate     CRP, inflammation     Tissue transglutaminase stuart IgA and IgG     Adult Gastro Ref - Procedure Only

## 2021-07-14 LAB
TTG IGA SER-ACNC: <0.2 U/ML
TTG IGG SER-ACNC: <0.6 U/ML

## 2021-07-14 NOTE — RESULT ENCOUNTER NOTE
Ali, your thyroid is still normal, your comprehensive metabolic panel (liver and kidney panel) is still normal.  Your tests for inflammation are normal.  Your test for gluten allergy is not done yet.  Let us wait and see what the scopes show.  Aileen Dominguez MD

## 2021-07-15 ENCOUNTER — MYC MEDICAL ADVICE (OUTPATIENT)
Dept: FAMILY MEDICINE | Facility: CLINIC | Age: 36
End: 2021-07-15

## 2021-07-20 ENCOUNTER — TELEPHONE (OUTPATIENT)
Dept: GASTROENTEROLOGY | Facility: CLINIC | Age: 36
End: 2021-07-20

## 2021-07-20 DIAGNOSIS — Z11.59 ENCOUNTER FOR SCREENING FOR OTHER VIRAL DISEASES: ICD-10-CM

## 2021-07-20 NOTE — TELEPHONE ENCOUNTER
Screening Questions  1. Are you active on mychart?    2. What insurance is in the chart? [employer]    2.  Ordering/Referring Provider: Aileen Dominguez MD    3. BMI 23.4    4. Are you on daily home oxygen? n    5. Do you have a history of difficult airway? n    6. Have you had a heart, lung, or liver transplant? n    7. Are you currently on dialysis? n    8. Have you had a stroke or Transient ischemic atttack (TIA) within 6 months? n    9. In the past 6 months, have you had any heart related issues including cardiomyopathy or heart attack?         If yes, did it require cardiac stenting or other implantable device?n    10. Do you have any implantable devices in your body (pacemaker, defib, LVAD)? n    11. Do you take nitroglycerin? If yes, how often? n    12. Are you currently taking any blood thinners?n    13. Are you a diabetic? n    14. (Females) Are you currently pregnant? n  If yes, how many weeks?    15. Have you had a procedure in the past that was difficult to tolerate with conscious sedation? Any allergies to Fentanyl or Versed n    16. Are you taking any scheduled prescription narcotics more than once daily? n    17. Do you have any chemical dependencies such as alcohol, street drugs, or methadone? n    18. Do you have any history of post-traumatic stress syndrome or mental health issues? n    19. Do you transfer independently? y    20.  Do you have any issues with constipation? n    21. Preferred Pharmacy for Pre Prescription     Scheduling Details    Colonoscopy Prep Sent?:   Procedure Scheduled: Upper and lower Endo  Provider/Surgeon: Romo  Date of Procedure: 8/27  Location:   Caller (Please ask for phone number if not scheduled by patient): Ali      Sedation Type: CS  Conscious Sedation- Needs  for 6 hours after the procedure  MAC/General-Needs  for 24 hours after procedure    Pre-op Required at [facility] [place] [place] and OR for MAC sedation:   (if yes advise  patient they will need a pre-op prior to procedure)      Is patient on blood thinners? -N (If yes- inform patient to follow up with PCP or provider for follow up instructions)     Informed patient they will need an adult  Y  Cannot take any type of public or medical transportation alone    Informed Patient of COVID Test Requirement y    Confirmed Nurse will call to complete assessment Y    Additional comments:

## 2021-08-24 ENCOUNTER — LAB (OUTPATIENT)
Dept: LAB | Facility: CLINIC | Age: 36
End: 2021-08-24
Payer: COMMERCIAL

## 2021-08-24 DIAGNOSIS — Z11.59 ENCOUNTER FOR SCREENING FOR OTHER VIRAL DISEASES: ICD-10-CM

## 2021-08-24 LAB — SARS-COV-2 RNA RESP QL NAA+PROBE: NEGATIVE

## 2021-08-24 PROCEDURE — U0003 INFECTIOUS AGENT DETECTION BY NUCLEIC ACID (DNA OR RNA); SEVERE ACUTE RESPIRATORY SYNDROME CORONAVIRUS 2 (SARS-COV-2) (CORONAVIRUS DISEASE [COVID-19]), AMPLIFIED PROBE TECHNIQUE, MAKING USE OF HIGH THROUGHPUT TECHNOLOGIES AS DESCRIBED BY CMS-2020-01-R: HCPCS

## 2021-08-24 PROCEDURE — U0005 INFEC AGEN DETEC AMPLI PROBE: HCPCS

## 2021-08-26 ENCOUNTER — ANESTHESIA EVENT (OUTPATIENT)
Dept: GASTROENTEROLOGY | Facility: CLINIC | Age: 36
End: 2021-08-26
Payer: COMMERCIAL

## 2021-08-26 NOTE — ANESTHESIA PREPROCEDURE EVALUATION
Anesthesia Pre-Procedure Evaluation    Patient: Rosemarie Wade   MRN: [id number] : 1985        Preoperative Diagnosis: Chronic diarrhea [K52.9]   Procedure : Procedure(s):  ESOPHAGOGASTRODUODENOSCOPY (EGD)  COLONOSCOPY     Past Medical History:   Diagnosis Date     NO ACTIVE PROBLEMS       Past Surgical History:   Procedure Laterality Date     HC TOOTH EXTRACTION W/FORCEP       LAPAROSCOPIC CHOLECYSTECTOMY N/A 2020    Procedure: Laparoscopic cholecystectomy;  Surgeon: Juan Francisco Romo MD;  Location: [facility]      Allergies   Allergen Reactions     No Known Drug Allergies       Social History     Tobacco Use     Smoking status: Never Smoker     Smokeless tobacco: Never Used   Substance Use Topics     Alcohol use: No      Wt Readings from Last 1 Encounters:   21 67.9 kg (149 lb 9.6 oz)        Anesthesia Evaluation   Pt has had prior anesthetic. Type: MAC, General and Regional.    No history of anesthetic complications       ROS/MED HX  ENT/Pulmonary:  - neg pulmonary ROS     Neurologic:  - neg neurologic ROS     Cardiovascular:     (+) Dyslipidemia -----    METS/Exercise Tolerance:     Hematologic:  - neg hematologic  ROS     Musculoskeletal:  - neg musculoskeletal ROS     GI/Hepatic:  - neg GI/hepatic ROS     Renal/Genitourinary:  - neg Renal ROS     Endo:  - neg endo ROS     Psychiatric/Substance Use:  - neg psychiatric ROS     Infectious Disease:  - neg infectious disease ROS     Malignancy:  - neg malignancy ROS     Other:  - neg other ROS   (-) Any chance pregnant       Physical Exam    Airway  airway exam normal      Mallampati: II   TM distance: > 3 FB   Neck ROM: full   Mouth opening: > 3 cm    Respiratory Devices and Support         Dental  no notable dental history         Cardiovascular   cardiovascular exam normal       Rhythm and rate: regular and normal     Pulmonary   pulmonary exam normal        breath sounds clear to auscultation           OUTSIDE LABS:  CBC:   Lab Results    Component Value Date    WBC 8.5 03/24/2020    WBC 6.5 12/22/2014    HGB 13.5 03/24/2020    HGB 13.3 12/22/2014    HCT 40.6 03/24/2020    HCT 39.0 12/22/2014     03/24/2020     12/22/2014     BMP:   Lab Results   Component Value Date     07/13/2021     03/24/2020    POTASSIUM 3.7 07/13/2021    POTASSIUM 3.8 03/24/2020    CHLORIDE 108 07/13/2021    CHLORIDE 105 03/24/2020    CO2 25 07/13/2021    CO2 23 03/24/2020    BUN 11 07/13/2021    BUN 13 03/24/2020    CR 0.83 07/13/2021    CR 0.70 03/24/2020    GLC 91 07/13/2021     (H) 03/24/2020     COAGS: No results found for: PTT, INR, FIBR  POC:   Lab Results   Component Value Date    HCG Negative 03/24/2020     HEPATIC:   Lab Results   Component Value Date    ALBUMIN 3.9 07/13/2021    PROTTOTAL 7.7 07/13/2021    ALT 21 07/13/2021    AST 13 07/13/2021    ALKPHOS 43 07/13/2021    BILITOTAL 0.5 07/13/2021     OTHER:   Lab Results   Component Value Date    FREDERIC 8.8 07/13/2021    LIPASE 104 03/24/2020    TSH 1.59 07/13/2021    CRP <2.9 07/13/2021    SED 8 07/13/2021       Anesthesia Plan    ASA Status:  1   NPO Status:  NPO Appropriate    Anesthesia Type: MAC.     - Reason for MAC: straight local not clinically adequate   Induction: Intravenous, Propofol.   Maintenance: TIVA.        Consents    Anesthesia Plan(s) and associated risks, benefits, and realistic alternatives discussed. Questions answered and patient/representative(s) expressed understanding.     - Discussed with:  Patient      - Extended Intubation/Ventilatory Support Discussed: No.      - Patient is DNR/DNI Status: No    Use of blood products discussed: No .     Postoperative Care    Pain management: IV analgesics.        Comments:    The risks and benefits of anesthesia, and the alternatives where applicable, have been discussed with the patient, and they wish to proceed.            HILDA Bowman CRNA

## 2021-08-27 ENCOUNTER — ANESTHESIA (OUTPATIENT)
Dept: GASTROENTEROLOGY | Facility: CLINIC | Age: 36
End: 2021-08-27
Payer: COMMERCIAL

## 2021-08-27 ENCOUNTER — HOSPITAL ENCOUNTER (OUTPATIENT)
Facility: CLINIC | Age: 36
Discharge: HOME | End: 2021-08-27
Attending: SURGERY | Admitting: SURGERY
Payer: COMMERCIAL

## 2021-08-27 VITALS
TEMPERATURE: 98 F | DIASTOLIC BLOOD PRESSURE: 79 MMHG | RESPIRATION RATE: 16 BRPM | HEART RATE: 65 BPM | OXYGEN SATURATION: 99 % | SYSTOLIC BLOOD PRESSURE: 109 MMHG

## 2021-08-27 LAB
COLONOSCOPY: NORMAL
UPPER GI ENDOSCOPY: NORMAL

## 2021-08-27 PROCEDURE — 45380 COLONOSCOPY AND BIOPSY: CPT | Performed by: SURGERY

## 2021-08-27 PROCEDURE — 250N000009 HC RX 250: Performed by: NURSE ANESTHETIST, CERTIFIED REGISTERED

## 2021-08-27 PROCEDURE — 370N000017 HC ANESTHESIA TECHNICAL FEE, PER MIN: Performed by: SURGERY

## 2021-08-27 PROCEDURE — 88305 TISSUE EXAM BY PATHOLOGIST: CPT | Mod: TC | Performed by: SURGERY

## 2021-08-27 PROCEDURE — 43235 EGD DIAGNOSTIC BRUSH WASH: CPT | Performed by: SURGERY

## 2021-08-27 PROCEDURE — 43239 EGD BIOPSY SINGLE/MULTIPLE: CPT | Mod: 51 | Performed by: SURGERY

## 2021-08-27 PROCEDURE — 250N000011 HC RX IP 250 OP 636: Performed by: NURSE ANESTHETIST, CERTIFIED REGISTERED

## 2021-08-27 RX ORDER — PROPOFOL 10 MG/ML
INJECTION, EMULSION INTRAVENOUS CONTINUOUS PRN
Status: DISCONTINUED | OUTPATIENT
Start: 2021-08-27 | End: 2021-08-27

## 2021-08-27 RX ORDER — SODIUM CHLORIDE, SODIUM LACTATE, POTASSIUM CHLORIDE, CALCIUM CHLORIDE 600; 310; 30; 20 MG/100ML; MG/100ML; MG/100ML; MG/100ML
INJECTION, SOLUTION INTRAVENOUS CONTINUOUS
Status: DISCONTINUED | OUTPATIENT
Start: 2021-08-27 | End: 2021-08-27 | Stop reason: HOSPADM

## 2021-08-27 RX ORDER — LIDOCAINE 40 MG/G
CREAM TOPICAL
Status: DISCONTINUED | OUTPATIENT
Start: 2021-08-27 | End: 2021-08-27 | Stop reason: HOSPADM

## 2021-08-27 RX ORDER — LIDOCAINE HYDROCHLORIDE 20 MG/ML
INJECTION, SOLUTION INFILTRATION; PERINEURAL PRN
Status: DISCONTINUED | OUTPATIENT
Start: 2021-08-27 | End: 2021-08-27

## 2021-08-27 RX ORDER — PROPOFOL 10 MG/ML
INJECTION, EMULSION INTRAVENOUS PRN
Status: DISCONTINUED | OUTPATIENT
Start: 2021-08-27 | End: 2021-08-27

## 2021-08-27 RX ADMIN — PROPOFOL 100 MG: 10 INJECTION, EMULSION INTRAVENOUS at 08:04

## 2021-08-27 RX ADMIN — PROPOFOL 200 MCG/KG/MIN: 10 INJECTION, EMULSION INTRAVENOUS at 08:04

## 2021-08-27 RX ADMIN — LIDOCAINE HYDROCHLORIDE 100 MG: 20 INJECTION, SOLUTION INFILTRATION; PERINEURAL at 07:59

## 2021-08-27 NOTE — LETTER
Rosemarie Wade  [address] [facility] [address]      August 31, 2021    Dear Rosemarie,  This letter is written to inform you of the results of your recent colonoscopy.  Your examination showed no abnormalities; however, I did random biopsies of the entire colon to see if there are any microscopic causes of your chronic diarrhea.  The result is as below:     Large intestine, random, biopsies:  -Liberally sampled clonic mucosa, histologically normal throughout, without evidence of lymphocytic colitis, collagenous colitis, acute inflammation or other microscopic alterations  -Several tissue pieces with small lymphoid aggregates    Lymphoid aggregates are collections of normal immune cells. This is a benign (non-cancerous) finding.    Given these findings, I recommend that you follow-up with a gastroenterologist, to discuss further work-up as previously planned.     Please remember that this recommendation is made with the understanding that you are not experiencing persistent changes in bowel function, bleeding per rectum, and/or significant abdominal pain. If you experience these symptoms, please contact your primary care provider for a further evaluation.     If you have any questions or concerns about the results of your colonoscopy or the appropriate follow-up, please contact my assistant at [phone]    Sincerely,        [facility], [facility],   [facility]  ___

## 2021-08-27 NOTE — DISCHARGE INSTRUCTIONS
[facility]    Home Care Following Endoscopy          Activity:    You have just undergone an endoscopic procedure usually performed with conscious sedation.  Do not work or operate machinery (including a car) for at least 12 hours.      I encourage you to walk and attempt to pass this air as soon as possible.    Diet:    Return to the diet you were on before your procedure but eat lightly for the first 12-24 hours.    Drink plenty of water.    Resume any regular medications unless otherwise advised by your physician.  Please begin any new medication prescribed as a result of your procedure as directed by your physician.     If you had any biopsy or polyp removed please refrain from aspirin or aspirin products for 2 days.  If on Coumadin please restart as instructed by your physician.   Pain:    You may take Tylenol as needed for pain.  Expected Recovery:    You can expect some mild abdominal fullness and/or discomfort due to the air used to inflate your intestinal tract. It is also normal to have a mild sore throat after upper endoscopy.    Call Your Physician if You Have:    After Upper Endoscopy:  o Shoulder, back or chest pain.  o Difficulty breathing or swallowing.  o Vomiting blood.    After Colonoscopy:  o Worsening persisting abdominal pain which is worse with activity.  o Fevers (>101 degrees F), chills or shakes.  o Passage of continued blood with bowel movements.   Any questions or concerns about your recovery, please call [phone] or after hours [phone] Nurse Advice Line.    Follow-up Care:    You should receive a call or letter with your results within 1 week. Please call if you have not received a notification of your results.  If asked to return to clinic please make an appointment 1 week after your procedure.  Call [phone].

## 2021-08-27 NOTE — ANESTHESIA CARE TRANSFER NOTE
Patient: Rosemarie Wade    Procedure(s):  ESOPHAGOGASTRODUODENOSCOPY (EGD)  COLONOSCOPY, WITH  BIOPSY    Diagnosis: Chronic diarrhea [K52.9]  Diagnosis Additional Information: No value filed.    Anesthesia Type:   MAC     Note:    Oropharynx: spontaneously breathing  Level of Consciousness: awake  Oxygen Supplementation: room air    Independent Airway: airway patency satisfactory and stable  Dentition: dentition unchanged  Vital Signs Stable: post-procedure vital signs reviewed and stable  Report to RN Given: handoff report given  Patient transferred to: Phase II    Handoff Report: Identifed the Patient, Identified the Reponsible Provider, Reviewed the pertinent medical history, Discussed the surgical course, Reviewed Intra-OP anesthesia mangement and issues during anesthesia, Set expectations for post-procedure period and Allowed opportunity for questions and acknowledgement of understanding      Vitals:  Vitals Value Taken Time   BP 89/54 08/27/21 0840   Temp     Pulse 68 08/27/21 0840   Resp 16 08/27/21 0830   SpO2 100 % 08/27/21 0842   Vitals shown include unvalidated device data.    Electronically Signed By: HILDA Bowman CRNA  August 27, 2021  8:44 AM

## 2021-08-27 NOTE — H&P
[facility]    Pre-Endoscopy History and Physical     Rosemarie Wade MRN# [id number]   YOB: 1985 Age: 35 year old     Date of Procedure: 8/27/2021  Primary care provider: No Ref-Primary, Physician  Type of Endoscopy: Colonoscopy with possible biopsy, possible polypectomy and Esophagogastroduodenoscopy with possible biopsy, possible dilation, possible foreign body removal  Reason for Procedure: diarrhea and abdominal pain   Type of Anesthesia Anticipated: MAC    HPI:    Rosemarie is a 35 year old female who will be undergoing the above procedure.  biliary colic wiht GBUS +stones but no signs acute; no GBWT; no CBD dilatation don ein 3/2020 in ED; s/p lap stephanie several years ago. need egd/colon for chronic diarrhea and reflux    A history and physical has been performed. The patient's medications and allergies have been reviewed. The risks and benefits of the procedure and the sedation options and risks were discussed with the patient.  All questions were answered and informed consent was obtained.      She denies a personal or family history of anesthesia complications or bleeding disorders.     Patient Active Problem List   Diagnosis     Contraception     CARDIOVASCULAR SCREENING; LDL GOAL LESS THAN 160     Hyperlipidemia LDL goal <160     Biliary colic symptom     Chronic cholecystitis with calculus        Past Medical History:   Diagnosis Date     NO ACTIVE PROBLEMS         Past Surgical History:   Procedure Laterality Date     HC TOOTH EXTRACTION W/FORCEP       LAPAROSCOPIC CHOLECYSTECTOMY N/A 11/12/2020    Procedure: Laparoscopic cholecystectomy;  Surgeon: Juan Francisco Romo MD;  Location:  OR       Social History     Tobacco Use     Smoking status: Never Smoker     Smokeless tobacco: Never Used   Substance Use Topics     Alcohol use: No       Family History   Problem Relation Age of Onset     Hypertension Brother      Diabetes Maternal Grandmother      Cancer Maternal  "Grandmother         ovarian CA in her 70s     Lung Cancer Maternal Grandfather         smoker, age 56     Hypertension Paternal Grandmother      Cerebrovascular Disease Paternal Grandmother         stroke, 80s     Hypertension Paternal Grandfather      Heart Disease Paternal Grandfather         MI at age 56     Gastrointestinal Disease Other         stomach issues on mom's side, no diagnosis       Prior to Admission medications    Medication Sig Start Date End Date Taking? Authorizing Provider   bismuth subsalicylate (PEPTO BISMOL) 262 MG chewable tablet Take 1 tablet by mouth 4 times daily (before meals and nightly)   Yes Reported, Patient   cholestyramine (QUESTRAN) 4 g packet Take 1 packet (4 g) by mouth 2 times daily (with meals) 4/30/21  Yes Juan Francisco Romo MD   desogestrel-ethinyl estradiol (DESOGEN) 0.15-30 MG-MCG tablet Take 1 tablet by mouth daily 10/9/20  Yes Aileen Dominguez MD       Allergies   Allergen Reactions     No Known Drug Allergies         REVIEW OF SYSTEMS:   5 point ROS negative except as noted above in HPI, including Gen., Resp., CV, GI &  system review.    PHYSICAL EXAM:   /65   Pulse 68   Temp 98  F (36.7  C) (Oral)   Resp 18  Estimated body mass index is 24.15 kg/m  as calculated from the following:    Height as of 11/12/20: 1.676 m (5' 6\").    Weight as of 7/13/21: 67.9 kg (149 lb 9.6 oz).   Constitutional: Awake, alert, no acute distress.  Eyes: No scleral icterus.  Conjunctiva are without injection.  ENMT: Mucous membranes moist, dentition and gums are intact.   Neck: Soft, supple, trachea midline.    Endocrine: n/a   Lymphatic: There is no cervical, submandibularadenopathy.  Respiratory: normal effortgs   Cardiovascular: S1, S2  Abdomen: Non-distended, non-tender,  No masses,  Musculoskeletal: No spinal or CVA tenderness. Full range of motion in the upper and lower extremities.    Skin: No skin rashes or lesions to inspection.  No petechia.    Neurologic: alerted " and oriented 3x  Psychiatric: The patient's affect is not blunted and mood is appropriate.  DIAGNOSTICS:    Not indicated    IMPRESSION   ASA Class 2 - Mild systemic disease    PLAN:   Plan for Colonoscopy with possible biopsy, possible polypectomy and Esophagogastroduodenoscopy with possible biopsy, possible dilation, possible foreign body removal. We discussed the risks, benefits and alternatives and the patient wished to proceed.  Patient is cleared for the above procedure.    The above has been forwarded to the consulting provider.    [facility], [facility]

## 2021-08-27 NOTE — ANESTHESIA POSTPROCEDURE EVALUATION
Patient: Rosemarie Wade    Procedure(s):  ESOPHAGOGASTRODUODENOSCOPY (EGD)  COLONOSCOPY, WITH  BIOPSY    Diagnosis:Chronic diarrhea [K52.9]  Diagnosis Additional Information: No value filed.    Anesthesia Type:  MAC    Note:  Disposition: Outpatient   Postop Pain Control: Uneventful            Sign Out: Well controlled pain   PONV: No   Neuro/Psych: Uneventful            Sign Out: Acceptable/Baseline neuro status   Airway/Respiratory: Uneventful            Sign Out: Acceptable/Baseline resp. status   CV/Hemodynamics: Uneventful            Sign Out: Acceptable CV status   Other NRE: NONE   DID A NON-ROUTINE EVENT OCCUR? No    Event details/Postop Comments:  Pt was happy with anesthesia care.  No complications.  I will follow up with the pt if needed.           Last vitals:  Vitals Value Taken Time   BP 89/54 08/27/21 0840   Temp     Pulse 68 08/27/21 0840   Resp 16 08/27/21 0830   SpO2 100 % 08/27/21 0843   Vitals shown include unvalidated device data.    Electronically Signed By: HILDA Bowman CRNA  August 27, 2021  8:44 AM

## 2021-08-29 ENCOUNTER — MYC MEDICAL ADVICE (OUTPATIENT)
Dept: FAMILY MEDICINE | Facility: CLINIC | Age: 36
End: 2021-08-29
Payer: COMMERCIAL

## 2021-08-30 LAB
PATH REPORT.COMMENTS IMP SPEC: NORMAL
PATH REPORT.COMMENTS IMP SPEC: NORMAL
PATH REPORT.FINAL DX SPEC: NORMAL
PATH REPORT.GROSS SPEC: NORMAL
PATH REPORT.MICROSCOPIC SPEC OTHER STN: NORMAL
PATH REPORT.RELEVANT HX SPEC: NORMAL
PHOTO IMAGE: NORMAL

## 2021-08-30 PROCEDURE — 88305 TISSUE EXAM BY PATHOLOGIST: CPT | Mod: 26 | Performed by: PATHOLOGY

## 2021-09-03 ENCOUNTER — MYC MEDICAL ADVICE (OUTPATIENT)
Dept: FAMILY MEDICINE | Facility: CLINIC | Age: 36
End: 2021-09-03

## 2021-09-28 ENCOUNTER — MYC MEDICAL ADVICE (OUTPATIENT)
Dept: FAMILY MEDICINE | Facility: CLINIC | Age: 36
End: 2021-09-28
Payer: COMMERCIAL

## 2021-10-23 ENCOUNTER — HEALTH MAINTENANCE LETTER (OUTPATIENT)
Age: 36
End: 2021-10-23

## 2021-10-29 ENCOUNTER — MYC MEDICAL ADVICE (OUTPATIENT)
Dept: FAMILY MEDICINE | Facility: CLINIC | Age: 36
End: 2021-10-29

## 2021-10-29 DIAGNOSIS — K52.9 CHRONIC DIARRHEA: Primary | ICD-10-CM

## 2021-10-29 DIAGNOSIS — R10.84 ABDOMINAL PAIN, GENERALIZED: ICD-10-CM

## 2021-10-29 NOTE — TELEPHONE ENCOUNTER
Patient is informed PCP is out and RN put a a sticky note on PCP monitor for review upon return to clinic.  Esha Newton, DORONN, RN

## 2021-11-02 NOTE — TELEPHONE ENCOUNTER
Order placed and signed. See mychart note to patient. Please assist with referral.   Aileen Dominguez MD

## 2021-11-12 NOTE — TELEPHONE ENCOUNTER
REFERRAL INFORMATION:    Referring Provider:  Dr. Aileen Dominguez    Referring Clinic:  [facility]     Reason for Visit/Diagnosis: Chronic diarrhea, Abdominal pain      FUTURE VISIT INFORMATION:    Appointment Date: 2/15/2022    Appointment Time: 5 PM      NOTES STATUS DETAILS   OFFICE NOTE from Referring Provider Internal 7/13/2021 Office visit with Dr. Dominguez      OFFICE NOTE from Other Specialist Internal 11/24/2020 Office visit with Dr. Mendez ([facility])     12/22/14 Office visit with Scott Lopez PA-C ([facility])      HOSPITAL DISCHARGE SUMMARY/  ED VISITS Internal 3/24/2020 ([facility])    OPERATIVE REPORT N/A    MEDICATION LIST Internal         ENDOSCOPY  Internal EGD: 8/27/2021   COLONOSCOPY Internal 8/27/2021   ERCP N/A    EUS N/A    STOOL TESTING Internal 12/24/2021   PERTINENT LABS Internal    PATHOLOGY REPORTS (RELATED) Internal 8/27/2021   IMAGING (CT, MRI, EGD, MRCP, Small Bowel Follow Through/SBT, MR/CT Enterography) Internal US Abdomen: 3/24/2020

## 2021-11-15 ENCOUNTER — MYC MEDICAL ADVICE (OUTPATIENT)
Dept: FAMILY MEDICINE | Facility: CLINIC | Age: 36
End: 2021-11-15
Payer: COMMERCIAL

## 2021-11-15 ENCOUNTER — E-VISIT (OUTPATIENT)
Dept: FAMILY MEDICINE | Facility: CLINIC | Age: 36
End: 2021-11-15
Payer: COMMERCIAL

## 2021-11-15 DIAGNOSIS — R19.7 DIARRHEA, UNSPECIFIED TYPE: Primary | ICD-10-CM

## 2021-11-15 PROCEDURE — 99421 OL DIG E/M SVC 5-10 MIN: CPT | Performed by: FAMILY MEDICINE

## 2021-11-15 NOTE — PATIENT INSTRUCTIONS
Thank you for choosing us for your care. Given your symptoms, I would like you to do a lab-only visit to determine what is causing them.  I have placed the orders.  Please [place], or call [phone].  I will let you know when the results are back and next steps to take.

## 2021-11-19 ENCOUNTER — LAB (OUTPATIENT)
Dept: LAB | Facility: CLINIC | Age: 36
End: 2021-11-19
Payer: COMMERCIAL

## 2021-11-19 DIAGNOSIS — R19.7 DIARRHEA, UNSPECIFIED TYPE: ICD-10-CM

## 2021-11-19 PROCEDURE — 87177 OVA AND PARASITES SMEARS: CPT

## 2021-11-19 PROCEDURE — 87209 SMEAR COMPLEX STAIN: CPT

## 2021-11-19 PROCEDURE — 87506 IADNA-DNA/RNA PROBE TQ 6-11: CPT

## 2021-11-19 PROCEDURE — 87328 CRYPTOSPORIDIUM AG IA: CPT

## 2021-11-19 PROCEDURE — 87329 GIARDIA AG IA: CPT | Mod: 59

## 2021-11-22 LAB
C PARVUM AG STL QL IA: NEGATIVE
G LAMBLIA AG STL QL IA: NEGATIVE
O+P STL MICRO: NEGATIVE

## 2021-12-18 ENCOUNTER — HEALTH MAINTENANCE LETTER (OUTPATIENT)
Age: 36
End: 2021-12-18

## 2021-12-31 ASSESSMENT — ENCOUNTER SYMPTOMS
FREQUENCY: 0
PARESTHESIAS: 0
DYSURIA: 0
ABDOMINAL PAIN: 0
DIARRHEA: 1
FEVER: 0
NERVOUS/ANXIOUS: 0
CHILLS: 0
SORE THROAT: 0
DIZZINESS: 0
HEMATURIA: 0
COUGH: 0
EYE PAIN: 0
PALPITATIONS: 0
JOINT SWELLING: 0
NAUSEA: 1
WEAKNESS: 0
SHORTNESS OF BREATH: 0
CONSTIPATION: 0
ARTHRALGIAS: 0
BREAST MASS: 0
MYALGIAS: 0
HEMATOCHEZIA: 0
HEADACHES: 0
HEARTBURN: 0

## 2022-01-03 ENCOUNTER — OFFICE VISIT (OUTPATIENT)
Dept: FAMILY MEDICINE | Facility: CLINIC | Age: 37
End: 2022-01-03
Payer: COMMERCIAL

## 2022-01-03 VITALS
DIASTOLIC BLOOD PRESSURE: 64 MMHG | HEART RATE: 100 BPM | HEIGHT: 67 IN | TEMPERATURE: 97.3 F | BODY MASS INDEX: 23.86 KG/M2 | OXYGEN SATURATION: 100 % | SYSTOLIC BLOOD PRESSURE: 108 MMHG | RESPIRATION RATE: 18 BRPM | WEIGHT: 152 LBS

## 2022-01-03 DIAGNOSIS — Z30.41 ENCOUNTER FOR SURVEILLANCE OF CONTRACEPTIVE PILLS: ICD-10-CM

## 2022-01-03 DIAGNOSIS — K52.9 CHRONIC DIARRHEA: ICD-10-CM

## 2022-01-03 DIAGNOSIS — Z00.00 ROUTINE GENERAL MEDICAL EXAMINATION AT A HEALTH CARE FACILITY: Primary | ICD-10-CM

## 2022-01-03 DIAGNOSIS — Z91.018 FOOD ALLERGY: ICD-10-CM

## 2022-01-03 DIAGNOSIS — Z12.4 SCREENING FOR MALIGNANT NEOPLASM OF CERVIX: ICD-10-CM

## 2022-01-03 DIAGNOSIS — Z23 NEED FOR TDAP VACCINATION: ICD-10-CM

## 2022-01-03 DIAGNOSIS — Z13.6 CARDIOVASCULAR SCREENING; LDL GOAL LESS THAN 160: ICD-10-CM

## 2022-01-03 LAB
CHOLEST SERPL-MCNC: 245 MG/DL
FASTING STATUS PATIENT QL REPORTED: YES
HDLC SERPL-MCNC: 66 MG/DL
LDLC SERPL CALC-MCNC: 149 MG/DL
NONHDLC SERPL-MCNC: 179 MG/DL
TRIGL SERPL-MCNC: 149 MG/DL

## 2022-01-03 PROCEDURE — 36415 COLL VENOUS BLD VENIPUNCTURE: CPT | Performed by: FAMILY MEDICINE

## 2022-01-03 PROCEDURE — G0145 SCR C/V CYTO,THINLAYER,RESCR: HCPCS | Performed by: FAMILY MEDICINE

## 2022-01-03 PROCEDURE — 90715 TDAP VACCINE 7 YRS/> IM: CPT | Performed by: FAMILY MEDICINE

## 2022-01-03 PROCEDURE — 99395 PREV VISIT EST AGE 18-39: CPT | Mod: 25 | Performed by: FAMILY MEDICINE

## 2022-01-03 PROCEDURE — 80061 LIPID PANEL: CPT | Performed by: FAMILY MEDICINE

## 2022-01-03 PROCEDURE — 90471 IMMUNIZATION ADMIN: CPT | Performed by: FAMILY MEDICINE

## 2022-01-03 PROCEDURE — 87624 HPV HI-RISK TYP POOLED RSLT: CPT | Performed by: FAMILY MEDICINE

## 2022-01-03 RX ORDER — DESOGESTREL AND ETHINYL ESTRADIOL 0.15-0.03
1 KIT ORAL DAILY
Qty: 84 TABLET | Refills: 3 | Status: SHIPPED | OUTPATIENT
Start: 2022-01-03 | End: 2023-01-12

## 2022-01-03 RX ORDER — HYOSCYAMINE SULFATE 0.125 MG
0.12 TABLET ORAL EVERY 4 HOURS PRN
Qty: 30 TABLET | Refills: 1 | Status: SHIPPED | OUTPATIENT
Start: 2022-01-03 | End: 2022-01-25

## 2022-01-03 ASSESSMENT — ENCOUNTER SYMPTOMS
CONSTIPATION: 0
NERVOUS/ANXIOUS: 0
CHILLS: 0
FEVER: 0
SORE THROAT: 0
HEADACHES: 0
PALPITATIONS: 0
ABDOMINAL PAIN: 0
HEARTBURN: 0
ARTHRALGIAS: 0
PARESTHESIAS: 0
BREAST MASS: 0
HEMATOCHEZIA: 0
DIZZINESS: 0
FREQUENCY: 0
WEAKNESS: 0
HEMATURIA: 0
JOINT SWELLING: 0
EYE PAIN: 0
SHORTNESS OF BREATH: 0
DYSURIA: 0
MYALGIAS: 0
DIARRHEA: 1
COUGH: 0
NAUSEA: 1

## 2022-01-03 ASSESSMENT — PAIN SCALES - GENERAL: PAINLEVEL: NO PAIN (0)

## 2022-01-03 ASSESSMENT — MIFFLIN-ST. JEOR: SCORE: 1394.16

## 2022-01-03 NOTE — RESULT ENCOUNTER NOTE
"Ali, here is your cholesterol panel result.   Your total cholesterol is high, and your \"bad\" LDL cholesterol is slightly high.   Your triglycerides and your \"good\" HDL cholesterol are normal.  This is not high enough to make me want to put you on medication, but something you are going to want to follow closely. I encourage you to follow a low-fat diet and eat plenty of vegetables and natural whole fiber to try to keep your cholesterol levels down.  Metamucil or other bulking fiber can help lower cholesterol AND bulk up diarrhea stools.   Aileen Dominguez MD "

## 2022-01-03 NOTE — PROGRESS NOTES
SUBJECTIVE:   CC: Rosemarie Wade is an 36 year old woman who presents for preventive health visit.       Patient has been advised of split billing requirements and indicates understanding: Yes  Healthy Habits:     Getting at least 3 servings of Calcium per day:  Yes    Bi-annual eye exam:  NO    Dental care twice a year:  Yes    Sleep apnea or symptoms of sleep apnea:  None    Diet:  Regular (no restrictions)    Frequency of exercise:  2-3 days/week    Duration of exercise:  15-30 minutes    Taking medications regularly:  Yes    Medication side effects:  None    PHQ-2 Total Score: 0    Additional concerns today:  Yes    See ROS    Today's PHQ-2 Score:   PHQ-2 ( 1999 [employer]) 12/31/2021   Q1: Little interest or pleasure in doing things 0   Q2: Feeling down, depressed or hopeless 0   PHQ-2 Score 0   PHQ-2 Total Score (12-17 Years)- Positive if 3 or more points; Administer PHQ-A if positive -   Q1: Little interest or pleasure in doing things Not at all   Q2: Feeling down, depressed or hopeless Not at all   PHQ-2 Score 0       Abuse: Current or Past (Physical, Sexual or Emotional) - No  Do you feel safe in your environment? Yes    Have you ever done Advance Care Planning? (For example, a Health Directive, POLST, or a discussion with a medical provider or your loved ones about your wishes): No, advance care planning information given to patient to review.  Advanced care planning was discussed at today's visit.    Social History     Tobacco Use     Smoking status: Never Smoker     Smokeless tobacco: Never Used   Substance Use Topics     Alcohol use: No     If you drink alcohol do you typically have >3 drinks per day or >7 drinks per week? No    Alcohol Use 12/31/2021   Prescreen: >3 drinks/day or >7 drinks/week? Not Applicable   Prescreen: >3 drinks/day or >7 drinks/week? -   No flowsheet data found.    Reviewed orders with patient.  Reviewed health maintenance and updated orders accordingly - Yes  BP Readings from  Last 3 Encounters:   01/03/22 108/64   08/27/21 109/79   07/13/21 102/82    Wt Readings from Last 3 Encounters:   01/03/22 68.9 kg (152 lb)   07/13/21 67.9 kg (149 lb 9.6 oz)   11/12/20 65.8 kg (145 lb)                  Patient Active Problem List   Diagnosis     Contraception     CARDIOVASCULAR SCREENING; LDL GOAL LESS THAN 160     Hyperlipidemia LDL goal <160     Biliary colic symptom     Chronic cholecystitis with calculus     Past Surgical History:   Procedure Laterality Date     COLONOSCOPY N/A 8/27/2021    Procedure: COLONOSCOPY, WITH  BIOPSY;  Surgeon: Juan Francisco Romo MD;  Location:  GI     ESOPHAGOSCOPY, GASTROSCOPY, DUODENOSCOPY (EGD), COMBINED N/A 8/27/2021    Procedure: ESOPHAGOGASTRODUODENOSCOPY (EGD);  Surgeon: Juan Francisco Romo MD;  Location: [facility]     HC TOOTH EXTRACTION W/FORCEP       LAPAROSCOPIC CHOLECYSTECTOMY N/A 11/12/2020    Procedure: Laparoscopic cholecystectomy;  Surgeon: Juan Francisco Romo MD;  Location: [facility]       Social History     Tobacco Use     Smoking status: Never Smoker     Smokeless tobacco: Never Used   Substance Use Topics     Alcohol use: No     Family History   Problem Relation Age of Onset     Hyperlipidemia Mother      Hyperlipidemia Father      Gestational Diabetes Sister      Hypertension Brother      Hyperlipidemia Brother      Diabetes Maternal Grandmother      Cancer Maternal Grandmother         ovarian CA in her 70s     Lung Cancer Maternal Grandfather         smoker, age 56     Hypertension Paternal Grandmother      Cerebrovascular Disease Paternal Grandmother         stroke, 80s     Hypertension Paternal Grandfather      Heart Disease Paternal Grandfather         MI at age 56     Gastrointestinal Disease Other         stomach issues on mom's side, no diagnosis           Breast Cancer Screening:    FHS-7:   Breast CA Risk Assessment (FHS-7) 12/31/2021   Did any of your first-degree relatives have breast or ovarian cancer? Yes   Did any of your relatives have bilateral  breast cancer? No   Did any man in your family have breast cancer? No   Did any woman in your family have breast and ovarian cancer? Yes   Did any woman in your family have breast cancer before age 50 y? No   Do you have 2 or more relatives with breast and/or ovarian cancer? No   Do you have 2 or more relatives with breast and/or bowel cancer? No       Patient under 40 years of age: Routine Mammogram Screening not recommended.     History of abnormal Pap smear: NO - age 30- 65 PAP every 3 years recommended  PAP / HPV Latest Ref Rng & Units 3/8/2017 8/23/2013 7/28/2011   PAP (Historical) - NIL NIL NIL   HPV16 NEG Negative - -   HPV18 NEG Negative - -   HRHPV NEG Negative - -     Reviewed and updated as needed this visit by clinical staff  Tobacco  Allergies  Meds             Reviewed and updated as needed this visit by Provider                   Review of Systems   Constitutional: Negative for chills and fever.   HENT: Negative for congestion, ear pain, hearing loss and sore throat.    Eyes: Negative for pain and visual disturbance.   Respiratory: Negative for cough and shortness of breath.    Cardiovascular: Negative for chest pain, palpitations and peripheral edema.   Gastrointestinal: Positive for diarrhea and nausea. Negative for abdominal pain, constipation, heartburn and hematochezia.   Breasts:  Negative for tenderness, breast mass and discharge.   Genitourinary: Negative for dysuria, frequency, genital sores, hematuria, pelvic pain, urgency, vaginal bleeding and vaginal discharge.   Musculoskeletal: Negative for arthralgias, joint swelling and myalgias.   Skin: Negative for rash.   Neurological: Negative for dizziness, weakness, headaches and paresthesias.   Psychiatric/Behavioral: Negative for mood changes. The patient is not nervous/anxious.      She is still dealing with GI issues.  She has been limiting processed foods and gluten and it helps a little.  She did test from [facility] and it showed that  "she has a moderate allergy to eggs and mild allergies to almonds, chicken and turkey.  She takes Pepto-Bismol and Imodium daily.  She recently saw GI and had a colonoscopy and upper endoscopy and both were entirely normal.  Random colonic biopsies were taken and showed no abnormal pathology.    Periods are regular on OCPs.     OBJECTIVE:   /64   Pulse 100   Temp 97.3  F (36.3  C) (Temporal)   Resp 18   Ht 1.689 m (5' 6.5\")   Wt 68.9 kg (152 lb)   LMP 01/01/2022   SpO2 100%   BMI 24.17 kg/m    Physical Exam  GENERAL: healthy, alert and no distress  EYES: Eyes grossly normal to inspection, PERRL and conjunctivae and sclerae normal  HENT: ear canals and TM's normal, nose and mouth without ulcers or lesions  NECK: no adenopathy, no asymmetry, masses, or scars and thyroid normal to palpation  RESP: lungs clear to auscultation - no rales, rhonchi or wheezes  BREAST: normal without masses, tenderness or nipple discharge and no palpable axillary masses or adenopathy  CV: regular rate and rhythm, normal S1 S2, no S3 or S4, no murmur, click or rub, no peripheral edema and peripheral pulses strong  ABDOMEN: soft, nontender, no hepatosplenomegaly, no masses and bowel sounds normal   (female): Vaginal exam reveals normal external and internal genitalia.  Cervix is closed, long and thick.  No lesions or abnormalities seen.  Pap co-test collected. Bimanual exam reveals a nontender, nongravid uterus with no CMT.  No adnexal masses or tenderness.    MS: no gross musculoskeletal defects noted, no edema  SKIN: no suspicious lesions or rashes  NEURO: Normal strength and tone, mentation intact and speech normal  PSYCH: mentation appears normal, affect normal/bright    Diagnostic Test Results:  Orders Placed This Encounter   Procedures     TDAP VACCINE (Adacel, Boostrix)  [[id number]]     Lipid panel reflex to direct LDL Fasting     HPV High Risk Types DNA Cervical        ASSESSMENT/PLAN:       ICD-10-CM    1. Routine " "general medical examination at a health care facility  Z00.00    2. Chronic diarrhea  K52.9 hyoscyamine (LEVSIN) 0.125 MG tablet   3. Food allergy  Z91.018    4. Encounter for surveillance of contraceptive pills  Z30.41 desogestrel-ethinyl estradiol (ISIBLOOM) 0.15-30 MG-MCG tablet   5. Need for Tdap vaccination  Z23 TDAP VACCINE (Adacel, Boostrix)  [[id number]]   6. Screening for malignant neoplasm of cervix  Z12.4 Pap screen with HPV - recommended age 30 - 65 years     HPV Hold (Lab Only)     HPV High Risk Types DNA Cervical   7. CARDIOVASCULAR SCREENING; LDL GOAL LESS THAN 160  Z13.6 Lipid panel reflex to direct LDL Fasting     Lipid panel reflex to direct LDL Fasting       Patient has been advised of split billing requirements and indicates understanding: Yes  I recommend a yearly physical, monthly SBE, pap every 3 years if normal.     We'll try her on levsin for her diarrhea symptoms. Continued dietary avoidance of allergy foods if helpful for symptom management.     I refilled her OCPs.   Screening lipids pending.     COUNSELING:  Reviewed preventive health counseling, as reflected in patient instructions  Special attention given to:        Regular exercise       Healthy diet/nutrition       Vision screening       Immunizations    Vaccinated for: Tdap    Declines flu vaccine    Will get covid at pharmacy               Colon cancer screening is up to date now for 10 years.     Estimated body mass index is 24.17 kg/m  as calculated from the following:    Height as of this encounter: 1.689 m (5' 6.5\").    Weight as of this encounter: 68.9 kg (152 lb).        She reports that she has never smoked. She has never used smokeless tobacco.      Counseling Resources:  ATP IV Guidelines  Pooled Cohorts Equation Calculator  Breast Cancer Risk Calculator  BRCA-Related Cancer Risk Assessment: FHS-7 Tool  FRAX Risk Assessment  ICSI Preventive Guidelines  Dietary Guidelines for Americans, 2010  [employer]'s MyPlate  ASA " Prophylaxis  Lung CA Screening    Aileen Dominguez MD  [facility]

## 2022-01-05 LAB
BKR LAB AP GYN ADEQUACY: NORMAL
BKR LAB AP GYN INTERPRETATION: NORMAL
BKR LAB AP HPV REFLEX: NORMAL
BKR LAB AP LMP: NORMAL
BKR LAB AP PREVIOUS ABNORMAL: NORMAL
PATH REPORT.COMMENTS IMP SPEC: NORMAL
PATH REPORT.COMMENTS IMP SPEC: NORMAL
PATH REPORT.RELEVANT HX SPEC: NORMAL

## 2022-01-06 LAB
HUMAN PAPILLOMA VIRUS 16 DNA: NEGATIVE
HUMAN PAPILLOMA VIRUS 18 DNA: NEGATIVE
HUMAN PAPILLOMA VIRUS FINAL DIAGNOSIS: NORMAL
HUMAN PAPILLOMA VIRUS OTHER HR: NEGATIVE

## 2022-01-24 PROBLEM — Z91.018 FOOD ALLERGY: Status: ACTIVE | Noted: 2022-01-24

## 2022-01-24 PROBLEM — Z30.41 ENCOUNTER FOR SURVEILLANCE OF CONTRACEPTIVE PILLS: Status: ACTIVE | Noted: 2022-01-24

## 2022-01-24 PROBLEM — K52.9 CHRONIC DIARRHEA: Status: ACTIVE | Noted: 2022-01-24

## 2022-01-24 ASSESSMENT — ENCOUNTER SYMPTOMS
BLOOD IN STOOL: 0
NAUSEA: 1
DIARRHEA: 1
CONSTIPATION: 0
HEARTBURN: 0
RECTAL PAIN: 0
BLOATING: 1
JAUNDICE: 0
BOWEL INCONTINENCE: 0
VOMITING: 0
ABDOMINAL PAIN: 1

## 2022-01-25 ENCOUNTER — VIRTUAL VISIT (OUTPATIENT)
Dept: GASTROENTEROLOGY | Facility: CLINIC | Age: 37
End: 2022-01-25
Attending: FAMILY MEDICINE
Payer: COMMERCIAL

## 2022-01-25 DIAGNOSIS — R10.84 ABDOMINAL PAIN, GENERALIZED: ICD-10-CM

## 2022-01-25 DIAGNOSIS — K52.9 CHRONIC DIARRHEA: ICD-10-CM

## 2022-01-25 PROCEDURE — 99203 OFFICE O/P NEW LOW 30 MIN: CPT | Mod: GT | Performed by: PHYSICIAN ASSISTANT

## 2022-01-25 RX ORDER — DICYCLOMINE HYDROCHLORIDE 10 MG/1
10 CAPSULE ORAL 4 TIMES DAILY PRN
Qty: 30 CAPSULE | Refills: 1 | Status: SHIPPED | OUTPATIENT
Start: 2022-01-25 | End: 2023-01-27

## 2022-01-25 NOTE — PROGRESS NOTES
Dillan is a 36 year old who is being evaluated via a billable video visit.      How would you like to obtain your AVS? MyChart  If the video visit is dropped, the invitation should be resent by: Text to cell phone: [phone]  Will anyone else be joining your video visit? No      Video Start Time: 5:11 PM  Video-Visit Details    Type of service:  Video Visit    Video End Time:5:38 PM    Originating Location (pt. Location): Home    Distant Location (provider location):  [facility] [place]     Platform used for Video Visit: Well     GI CLINIC VISIT    CC/REFERRING MD:  Aileen Pinto*  REASON FOR CONSULTATION: Diarrhea, abdominal pain  ASSESSMENT/PLAN:  1.  Diarrhea, abdominal pain  Patient presents today for longstanding abdominal pain and watery diarrhea.  She has had work-up including negative infectious stool studies, lab with negative TTG (will collect DGP antibodies today), grossly normal upper endoscopy and colonoscopy with normal random biopsies.  We will plan to do stool sample for H. pylori given abdominal pain.  Differential for symptoms includes irritable bowel syndrome with diarrhea, small intestinal bacterial overgrowth, functional diarrhea.  She does seem to have component of postcholecystectomy bile acid diarrhea given worsening symptoms after her surgery.  Would recommend that she tries Bentyl on a daily basis given previous improvement with hyoscyamine.  Should also start fiber supplementation and increase to effect.  Okay to take as needed Imodium.  Would also recommend meeting with our GI dietitian to discuss adequate fiber and potential food triggers.    Future considerations include trial of rifaximin, cholestyramine, TCA.  --Track your symptoms using the [place] stool chart  --Meet with GI dietitian  --Try dicyclomine as needed for abdominal cramping.  You can take this up to 4 times a day.  Let me know how this goes and I can send you over more  pills  --Start fiber supplementation on a daily basis with Metamucil or Citrucel.  Use 1 teaspoon and gradually increase up by 1 teaspoon a day until you reach 1 to 2 tablespoons.    --Stool sample  --Blood work    RTC 6 weeks     Thank you for this consultation.  It was a pleasure to participate in the care of this patient; please contact us with any further questions.     Note completed using voice recognition software. Some word and grammatical errors may occur.    40 Minutes was spent on the date of the encounter during chart review, history and exam, documentation, and further activities as noted     Nhi Grayson PA-C  Division of Gastroenterology, Hepatology & Nutrition  [facility]        HPI  Rosemarie Wade is a 36-year-old woman who is status post cholecystectomy presenting for abdominal pain and diarrhea.  She is new to the [facility] clinic and this is my first encounter with the patient.      The patient reports that she has had symptoms of abdominal pain and diarrhea for the past 10 years.  She believes symptoms may have been getting worse.  About 7 years ago she did have Giardia.      Today she describes that her main symptoms are abdominal cramping pain which can be generalized abdominal pain.  This can be cramping that gets better with bowel movements.  Also can randomly have a heartburn type abdominal pain.  She feels better if she is standing sometimes during work.  She describes her bowel pattern is typically several bowel movements a day which are watery, and can be triggered by eating.  She will sometimes have pain at night associated with abdominal pain which can wake her up from her sleep.  This can affect the quality of her sleep.  She does take Imodium on a daily basis and as needed Pepto which helps, but this seems to make her feel more nauseated.  She denies melena or hematochezia.      She denies any early satiety, her weight is stable.  She avoids  eating throughout the day to try and prevent symptoms from occurring.    She has previously tried probiotics, digestive enzymes, hyoscyamine (this helped diarrhea but led to lightheadedness and nausea).  Cholecystectomy seem to slightly worsen symptoms.  She has had previous work-up with TTG IgG and IgA, colonoscopy with biopsies negative for microscopic colitis or inflammatory bowel disease, and she also had a EGD without biopsies which was normal.    She completed a food sensitivity test that said she may have moderate reactivity with eggs though with a illumination of eggs she did not see an improvement in her symptoms.  She was noted to be sensitive to other foods as well however this did not seem to influence her symptoms.    ROS:    See bottom of note    PROBLEM LIST  Patient Active Problem List    Diagnosis Date Noted     Chronic diarrhea 01/24/2022     Priority: Medium     Food allergy 01/24/2022     Priority: Medium     Encounter for surveillance of contraceptive pills 01/24/2022     Priority: Medium     Biliary colic symptom 10/26/2020     Priority: Medium     Added automatically from request for surgery [id number]       Chronic cholecystitis with calculus 10/26/2020     Priority: Medium     Added automatically from request for surgery [id number]       Hyperlipidemia LDL goal <160 09/11/2013     Priority: Medium     CARDIOVASCULAR SCREENING; LDL GOAL LESS THAN 160 07/28/2011     Priority: Medium     Contraception 12/08/2009     Priority: Medium       PERTINENT PAST MEDICAL HISTORY:  Past Medical History:   Diagnosis Date     NO ACTIVE PROBLEMS        PREVIOUS SURGERIES:  Past Surgical History:   Procedure Laterality Date     COLONOSCOPY N/A 8/27/2021    Procedure: COLONOSCOPY, WITH  BIOPSY;  Surgeon: Juan Francisco Romo MD;  Location: [facility]     ESOPHAGOSCOPY, GASTROSCOPY, DUODENOSCOPY (EGD), COMBINED N/A 8/27/2021    Procedure: ESOPHAGOGASTRODUODENOSCOPY (EGD);  Surgeon: Juan Francisco Romo MD;  [facility]  TOOTH EXTRACTION W/FORCEP       LAPAROSCOPIC CHOLECYSTECTOMY N/A 11/12/2020    Procedure: Laparoscopic cholecystectomy;  Surgeon: Juan Francisco Romo MD;  Location: [facility]       ALLERGIES:     Allergies   Allergen Reactions     Tracy Oil      Mild allergy to almond based on [facility] tests     Chicken Allergy      Mild chicken allergy based on [facility] tests     Eggs Or Egg-Derived Products [Chicken-Derived Products (Egg)]      Based on [facility] tests     No Known Drug Allergies      Other Food Allergy      Mild allergy to turkey based on [facility] tests       PERTINENT MEDICATIONS:    Current Outpatient Medications:      bismuth subsalicylate (PEPTO BISMOL) 262 MG chewable tablet, Take 1 tablet by mouth 4 times daily (before meals and nightly), Disp: , Rfl:      desogestrel-ethinyl estradiol (ISIBLOOM) 0.15-30 MG-MCG tablet, Take 1 tablet by mouth daily, Disp: 84 tablet, Rfl: 3     hyoscyamine (LEVSIN) 0.125 MG tablet, Take 1 tablet (125 mcg) by mouth every 4 hours as needed for cramping or diarrhea, Disp: 30 tablet, Rfl: 1    SOCIAL HISTORY:    Social History     Socioeconomic History     Marital status:      Spouse name: Jed     Number of children: 2     Years of education: Not on file     Highest education level: Not on file   Occupational History     Occupation: dental hygienist   Tobacco Use     Smoking status: Never Smoker     Smokeless tobacco: Never Used   Substance and Sexual Activity     Alcohol use: No     Drug use: No     Sexual activity: Yes     Partners: Male     Birth control/protection: Pill   Other Topics Concern      Service No     Blood Transfusions No     Caffeine Concern No     Comment: Advised not more than 16 ounces/day     Occupational Exposure Yes     Comment: Dental Hygenist - 4 days/week     Hobby Hazards No     Sleep Concern No     Stress Concern No     Weight Concern No     Special Diet No     Back Care No     Exercise Yes     Comment: 30 minutes treadmill and light  weights     Bike Helmet Not Asked     Seat Belt Yes     Self-Exams Not Asked     Parent/sibling w/ CABG, MI or angioplasty before 65F 55M? Not Asked   Social History Narrative     to Jed and lives in [place] with daughter, Abigail and son Mike.  No smokers in the home.  Has one indoor cat.  No concerns about domestic violence.     Social Determinants of Health     Financial Resource Strain: Not on file   Food Insecurity: Not on file   Transportation Needs: Not on file   Physical Activity: Not on file   Stress: Not on file   Social Connections: Not on file   Intimate Partner Violence: Not on file   Housing Stability: Not on file       FAMILY HISTORY:  Mother's side of family with bowel issue   Family History   Problem Relation Age of Onset     Hyperlipidemia Mother      Hyperlipidemia Father      Gestational Diabetes Sister      Hypertension Brother      Hyperlipidemia Brother      Diabetes Maternal Grandmother      Cancer Maternal Grandmother         ovarian CA in her 70s     Lung Cancer Maternal Grandfather         smoker, age 56     Hypertension Paternal Grandmother      Cerebrovascular Disease Paternal Grandmother         stroke, 80s     Hypertension Paternal Grandfather      Heart Disease Paternal Grandfather         MI at age 56     Gastrointestinal Disease Other         stomach issues on mom's side, no diagnosis       Past/family/social history reviewed and no changes    PHYSICAL EXAMINATION:  Constitutional: aaox3, cooperative, pleasant, not dyspneic/diaphoretic, no acute distress  Vitals reviewed: LMP 01/01/2022   Wt:   Wt Readings from Last 2 Encounters:   01/03/22 68.9 kg (152 lb)   07/13/21 67.9 kg (149 lb 9.6 oz)    General appearance: Healthy appearing adult, in no acute distress  Eyes: Sclera anicteric  Ears, nose, mouth and throat: No obvious external lesions of ears and nose, Hearing intact  Neck: Symmetric, No obvious external lesions  Respiratory: Normal respiration, no use of accessory  muscles   Skin: No rashes or jaundice   Psychiatric: Oriented to person, place and time, Appropriate mood and affect.       PERTINENT STUDIES:  Office Visit on 01/03/2022   Component Date Value Ref Range Status     Interpretation 01/03/2022 Negative for Intraepithelial Lesion or Malignancy (NILM)    Final     Comment 01/03/2022    Final                    Value:This result contains rich text formatting which cannot be displayed here.     Specimen Adequacy 01/03/2022 Satisfactory for evaluation, endocervical/transformation zone component present   Final     Clinical Information 01/03/2022    Final                    Value:This result contains rich text formatting which cannot be displayed here.     LMP/Menopause Date 01/03/2022    Final                    Value:This result contains rich text formatting which cannot be displayed here.     Reflex Testing 01/03/2022 Yes regardless of result   Final     Previous Abnormal? 01/03/2022    Final                    Value:This result contains rich text formatting which cannot be displayed here.     Performing Labs 01/03/2022    Final                    Value:This result contains rich text formatting which cannot be displayed here.     Cholesterol 01/03/2022 245* <200 mg/dL Final     Triglycerides 01/03/2022 149  <150 mg/dL Final     Direct Measure HDL 01/03/2022 66  >=50 mg/dL Final     LDL Cholesterol Calculated 01/03/2022 149* <=100 mg/dL Final     Non HDL Cholesterol 01/03/2022 179* <130 mg/dL Final     Patient Fasting > 8hrs? 01/03/2022 Yes   Final     Other HR HPV 01/03/2022 Negative  Negative Final     HPV16 DNA 01/03/2022 Negative  Negative Final     HPV18 DNA 01/03/2022 Negative  Negative Final     FINAL DIAGNOSIS 01/03/2022    Final                    Value:This result contains rich text formatting which cannot be displayed here.     Answers for HPI/ROS submitted by the patient on 1/24/2022  General Symptoms: No  Skin Symptoms: No  HENT Symptoms: No  EYE SYMPTOMS:  No  HEART SYMPTOMS: No  LUNG SYMPTOMS: No  INTESTINAL SYMPTOMS: Yes  URINARY SYMPTOMS: No  GYNECOLOGIC SYMPTOMS: No  BREAST SYMPTOMS: No  SKELETAL SYMPTOMS: No  BLOOD SYMPTOMS: No  NERVOUS SYSTEM SYMPTOMS: No  MENTAL HEALTH SYMPTOMS: No  Heart burn or indigestion: No  Nausea: Yes  Vomiting: No  Abdominal pain: Yes  Bloating: Yes  Constipation: No  Diarrhea: Yes  Blood in stool: No  Black stools: No  Rectal or Anal pain: No  Fecal incontinence: No  Yellowing of skin or eyes: No  Vomit with blood: No  Change in stools: No

## 2022-01-25 NOTE — LETTER
1/25/2022         RE: Rosemarie Wade  [address] [facility] [address]        Dear Colleague,    Thank you for referring your patient, Rosemarie Wade, to the [facility]. Please see a copy of my visit note below.    GI CLINIC VISIT    CC/REFERRING MD:  Aileen Pinto*  REASON FOR CONSULTATION: Diarrhea, abdominal pain  ASSESSMENT/PLAN:  1.  Diarrhea, abdominal pain  Patient presents today for longstanding abdominal pain and watery diarrhea.  She has had work-up including negative infectious stool studies, lab with negative TTG (will collect DGP antibodies today), grossly normal upper endoscopy and colonoscopy with normal random biopsies.  We will plan to do stool sample for H. pylori given abdominal pain.  Differential for symptoms includes irritable bowel syndrome with diarrhea, small intestinal bacterial overgrowth, functional diarrhea.  She does seem to have component of postcholecystectomy bile acid diarrhea given worsening symptoms after her surgery.  Would recommend that she tries Bentyl on a daily basis given previous improvement with hyoscyamine.  Should also start fiber supplementation and increase to effect.  Okay to take as needed Imodium.  Would also recommend meeting with our GI dietitian to discuss adequate fiber and potential food triggers.    Future considerations include trial of rifaximin, cholestyramine, TCA.  --Track your symptoms using the [place] stool chart  --Meet with GI dietitian  --Try dicyclomine as needed for abdominal cramping.  You can take this up to 4 times a day.  Let me know how this goes and I can send you over more pills  --Start fiber supplementation on a daily basis with Metamucil or Citrucel.  Use 1 teaspoon and gradually increase up by 1 teaspoon a day until you reach 1 to 2 tablespoons.    --Stool sample  --Blood work    RTC 6 weeks     Thank you for this consultation.  It was a pleasure to participate in  the care of this patient; please contact us with any further questions.     Note completed using voice recognition software. Some word and grammatical errors may occur.    40 Minutes was spent on the date of the encounter during chart review, history and exam, documentation, and further activities as noted     Nhi Grayson PA-C  Division of Gastroenterology, Hepatology & Nutrition  [facility]        LIVIA Wade is a 36-year-old woman who is status post cholecystectomy presenting for abdominal pain and diarrhea.  She is new to the [facility] GI clinic and this is my first encounter with the patient.      The patient reports that she has had symptoms of abdominal pain and diarrhea for the past 10 years.  She believes symptoms may have been getting worse.  About 7 years ago she did have Giardia.      Today she describes that her main symptoms are abdominal cramping pain which can be generalized abdominal pain.  This can be cramping that gets better with bowel movements.  Also can randomly have a heartburn type abdominal pain.  She feels better if she is standing sometimes during work.  She describes her bowel pattern is typically several bowel movements a day which are watery, and can be triggered by eating.  She will sometimes have pain at night associated with abdominal pain which can wake her up from her sleep.  This can affect the quality of her sleep.  She does take Imodium on a daily basis and as needed Pepto which helps, but this seems to make her feel more nauseated.  She denies melena or hematochezia.      She denies any early satiety, her weight is stable.  She avoids eating throughout the day to try and prevent symptoms from occurring.    She has previously tried probiotics, digestive enzymes, hyoscyamine (this helped diarrhea but led to lightheadedness and nausea).  Cholecystectomy seem to slightly worsen symptoms.  She has had previous work-up with TTG IgG and IgA,  colonoscopy with biopsies negative for microscopic colitis or inflammatory bowel disease, and she also had a EGD without biopsies which was normal.    She completed a food sensitivity test that said she may have moderate reactivity with eggs though with a illumination of eggs she did not see an improvement in her symptoms.  She was noted to be sensitive to other foods as well however this did not seem to influence her symptoms.    ROS:    See bottom of note    PROBLEM LIST  Patient Active Problem List    Diagnosis Date Noted     Chronic diarrhea 01/24/2022     Priority: Medium     Food allergy 01/24/2022     Priority: Medium     Encounter for surveillance of contraceptive pills 01/24/2022     Priority: Medium     Biliary colic symptom 10/26/2020     Priority: Medium     Added automatically from request for surgery [id number]       Chronic cholecystitis with calculus 10/26/2020     Priority: Medium     Added automatically from request for surgery [id number]       Hyperlipidemia LDL goal <160 09/11/2013     Priority: Medium     CARDIOVASCULAR SCREENING; LDL GOAL LESS THAN 160 07/28/2011     Priority: Medium     Contraception 12/08/2009     Priority: Medium       PERTINENT PAST MEDICAL HISTORY:  Past Medical History:   Diagnosis Date     NO ACTIVE PROBLEMS        PREVIOUS SURGERIES:  Past Surgical History:   Procedure Laterality Date     COLONOSCOPY N/A 8/27/2021    Procedure: COLONOSCOPY, WITH  BIOPSY;  Surgeon: Juan Francisco Romo MD;  Location:  GI     ESOPHAGOSCOPY, GASTROSCOPY, DUODENOSCOPY (EGD), COMBINED N/A 8/27/2021    Procedure: ESOPHAGOGASTRODUODENOSCOPY (EGD);  Surgeon: Juan Francisco Romo MD;  Location: [facility] GI     HC TOOTH EXTRACTION W/FORCEP       LAPAROSCOPIC CHOLECYSTECTOMY N/A 11/12/2020    Procedure: Laparoscopic cholecystectomy;  Surgeon: Juan Francisco Romo MD;  Location: [facility] OR       ALLERGIES:     Allergies   Allergen Reactions     [place] Oil      Mild allergy to almond based on everlywell tests     Chicken Allergy       Mild chicken allergy based on ever[facility]well tests     Eggs Or Egg-Derived Products [Chicken-Derived Products (Egg)]      Based on everlywell tests     No Known Drug Allergies      Other Food Allergy      Mild allergy to turkey based on ever[facility]well tests       PERTINENT MEDICATIONS:    Current Outpatient Medications:      bismuth subsalicylate (PEPTO BISMOL) 262 MG chewable tablet, Take 1 tablet by mouth 4 times daily (before meals and nightly), Disp: , Rfl:      desogestrel-ethinyl estradiol (ISIBLOOM) 0.15-30 MG-MCG tablet, Take 1 tablet by mouth daily, Disp: 84 tablet, Rfl: 3     hyoscyamine (LEVSIN) 0.125 MG tablet, Take 1 tablet (125 mcg) by mouth every 4 hours as needed for cramping or diarrhea, Disp: 30 tablet, Rfl: 1    SOCIAL HISTORY:    Social History     Socioeconomic History     Marital status:      Spouse name: Jed     Number of children: 2     Years of education: Not on file     Highest education level: Not on file   Occupational History     Occupation: dental hygienist   Tobacco Use     Smoking status: Never Smoker     Smokeless tobacco: Never Used   Substance and Sexual Activity     Alcohol use: No     Drug use: No     Sexual activity: Yes     Partners: Male     Birth control/protection: Pill   Other Topics Concern      Service No     Blood Transfusions No     Caffeine Concern No     Comment: Advised not more than 16 ounces/day     Occupational Exposure Yes     Comment: Dental Hygenist - 4 days/week     Hobby Hazards No     Sleep Concern No     Stress Concern No     Weight Concern No     Special Diet No     Back Care No     Exercise Yes     Comment: 30 minutes treadmill and light weights     Bike Helmet Not Asked     Seat Belt Yes     Self-Exams Not Asked     Parent/sibling w/ CABG, MI or angioplasty before 65F 55M? Not Asked   Social History Narrative     to Jed and lives in [place] with daughter, Abigail and son Mike.  No smokers in the home.  Has one indoor cat.  No concerns  about domestic violence.     Social Determinants of Health     Financial Resource Strain: Not on file   Food Insecurity: Not on file   Transportation Needs: Not on file   Physical Activity: Not on file   Stress: Not on file   Social Connections: Not on file   Intimate Partner Violence: Not on file   Housing Stability: Not on file       FAMILY HISTORY:  Mother's side of family with bowel issue   Family History   Problem Relation Age of Onset     Hyperlipidemia Mother      Hyperlipidemia Father      Gestational Diabetes Sister      Hypertension Brother      Hyperlipidemia Brother      Diabetes Maternal Grandmother      Cancer Maternal Grandmother         ovarian CA in her 70s     Lung Cancer Maternal Grandfather         smoker, age 56     Hypertension Paternal Grandmother      Cerebrovascular Disease Paternal Grandmother         stroke, 80s     Hypertension Paternal Grandfather      Heart Disease Paternal Grandfather         MI at age 56     Gastrointestinal Disease Other         stomach issues on mom's side, no diagnosis       Past/family/social history reviewed and no changes    PHYSICAL EXAMINATION:  Constitutional: aaox3, cooperative, pleasant, not dyspneic/diaphoretic, no acute distress  Vitals reviewed: LMP 01/01/2022   Wt:   Wt Readings from Last 2 Encounters:   01/03/22 68.9 kg (152 lb)   07/13/21 67.9 kg (149 lb 9.6 oz)    General appearance: Healthy appearing adult, in no acute distress  Eyes: Sclera anicteric  Ears, nose, mouth and throat: No obvious external lesions of ears and nose, Hearing intact  Neck: Symmetric, No obvious external lesions  Respiratory: Normal respiration, no use of accessory muscles   Skin: No rashes or jaundice   Psychiatric: Oriented to person, place and time, Appropriate mood and affect.       PERTINENT STUDIES:  Office Visit on 01/03/2022   Component Date Value Ref Range Status     Interpretation 01/03/2022 Negative for Intraepithelial Lesion or Malignancy (NILM)    Final      Comment 01/03/2022    Final                    Value:This result contains rich text formatting which cannot be displayed here.     Specimen Adequacy 01/03/2022 Satisfactory for evaluation, endocervical/transformation zone component present   Final     Clinical Information 01/03/2022    Final                    Value:This result contains rich text formatting which cannot be displayed here.     LMP/Menopause Date 01/03/2022    Final                    Value:This result contains rich text formatting which cannot be displayed here.     Reflex Testing 01/03/2022 Yes regardless of result   Final     Previous Abnormal? 01/03/2022    Final                    Value:This result contains rich text formatting which cannot be displayed here.     Performing Labs 01/03/2022    Final                    Value:This result contains rich text formatting which cannot be displayed here.     Cholesterol 01/03/2022 245* <200 mg/dL Final     Triglycerides 01/03/2022 149  <150 mg/dL Final     Direct Measure HDL 01/03/2022 66  >=50 mg/dL Final     LDL Cholesterol Calculated 01/03/2022 149* <=100 mg/dL Final     Non HDL Cholesterol 01/03/2022 179* <130 mg/dL Final     Patient Fasting > 8hrs? 01/03/2022 Yes   Final     Other HR HPV 01/03/2022 Negative  Negative Final     HPV16 DNA 01/03/2022 Negative  Negative Final     HPV18 DNA 01/03/2022 Negative  Negative Final     FINAL DIAGNOSIS 01/03/2022    Final                    Value:This result contains rich text formatting which cannot be displayed here.     Answers for HPI/ROS submitted by the patient on 1/24/2022  General Symptoms: No  Skin Symptoms: No  HENT Symptoms: No  EYE SYMPTOMS: No  HEART SYMPTOMS: No  LUNG SYMPTOMS: No  INTESTINAL SYMPTOMS: Yes  URINARY SYMPTOMS: No  GYNECOLOGIC SYMPTOMS: No  BREAST SYMPTOMS: No  SKELETAL SYMPTOMS: No  BLOOD SYMPTOMS: No  NERVOUS SYSTEM SYMPTOMS: No  MENTAL HEALTH SYMPTOMS: No  Heart burn or indigestion: No  Nausea: Yes  Vomiting: No  Abdominal pain:  Yes  Bloating: Yes  Constipation: No  Diarrhea: Yes  Blood in stool: No  Black stools: No  Rectal or Anal pain: No  Fecal incontinence: No  Yellowing of skin or eyes: No  Vomit with blood: No  Change in stools: No        Nhi Grayson PA-C

## 2022-01-26 ENCOUNTER — TELEPHONE (OUTPATIENT)
Dept: GASTROENTEROLOGY | Facility: CLINIC | Age: 37
End: 2022-01-26
Payer: COMMERCIAL

## 2022-01-26 NOTE — PATIENT INSTRUCTIONS
It was a pleasure taking care of you today.  I've included a brief summary of our discussion and care plan from today's visit below.  Please review this information with your primary care provider.  ______________________________________________________________________    My recommendations are summarized as follows:    --Track your symptoms using the [facility] stool chart  --Meet with GI dietitian  --Try dicyclomine as needed for abdominal cramping.  You can take this up to 4 times a day.  Let me know how this goes and I can send you over more pills  --Start fiber supplementation on a daily basis with Metamucil or Citrucel.  Use 1 teaspoon and gradually increase up by 1 teaspoon a day until you reach 1 to 2 tablespoons.    --Stool sample  --Blood work  -- please see scheduling information provided below     Return to GI Clinic in 6 weeks to review your progress.    ______________________________________________________________________    How do I schedule labs, imaging studies, or procedures that were ordered in clinic today?     Labs: To schedule lab appointment at the [facility], use my chart or call [phone]. If you have a [facility] lab closer to home where you are regularly seen you can give them a call.     Procedures: If a colonoscopy, upper endoscopy, breath test, esophageal manometry, or pH impedence was ordered today, our endoscopy team will call you to schedule this. If you have not heard from our endoscopy team within a week, please call [phone] to schedule.     Imaging Studies: If you were scheduled for a CT scan, X-ray, MRI, ultrasound, HIDA scan or other imaging study, please call [phone] to have this scheduled.     Referral: If a referral to another specialty was ordered, expect a phone call or follow instructions above. If you have not heard from anyone regarding your referral in a week, please call our clinic to check the status.     Who do I call with any questions  after my visit?  Please be in touch if there are any further questions that arise following today's visit.  There are multiple ways to contact your gastroenterology care team.        During business hours, you may reach a Gastroenterology nurse at [phone]      To schedule or reschedule an appointment, please call [phone].       You can always send a secure message through [employer].  Marvel messages are answered by your nurse or doctor typically within 24 hours.  Please allow extra time on weekends and holidays.        For urgent/emergent questions after business hours, you may reach the on-call GI Fellow by contacting the [facility] at [phone].     How will I get the results of any tests ordered?    You will receive all of your results.  If you have signed up for [employer], any tests ordered at your visit will be available to you after your provider reviews them.  Typically this takes 1-2 weeks.  If there are urgent results that require a change in your care plan, your provider or nurse will call you to discuss the next steps.      What is Marvel?  Marvel is a secure way for you to access all of your healthcare records from the [facility].  It is a web based computer program, so you can sign on to it from any location.  It also allows you to send secure messages to your care team.  I recommend signing up for Marvel access if you have not already done so and are comfortable with using a computer.      How to I schedule a follow-up visit?  If you did not schedule a follow-up visit today, please call [phone] to schedule a follow-up office visit.      Sincerely,    Nhi Grayson PA-C  Division of Gastroenterology, Hepatology & Nutrition  [facility]

## 2022-01-26 NOTE — TELEPHONE ENCOUNTER
natym to schedule 6 week follow up with IVAN LANDRY, left call center phone number and sent [employer].

## 2022-02-15 ENCOUNTER — PRE VISIT (OUTPATIENT)
Dept: GASTROENTEROLOGY | Facility: CLINIC | Age: 37
End: 2022-02-15

## 2022-10-09 ENCOUNTER — HEALTH MAINTENANCE LETTER (OUTPATIENT)
Age: 37
End: 2022-10-09

## 2023-01-12 DIAGNOSIS — Z30.41 ENCOUNTER FOR SURVEILLANCE OF CONTRACEPTIVE PILLS: ICD-10-CM

## 2023-01-12 RX ORDER — DESOGESTREL AND ETHINYL ESTRADIOL 0.15-0.03
KIT ORAL
Qty: 84 TABLET | Refills: 0 | Status: SHIPPED | OUTPATIENT
Start: 2023-01-12 | End: 2023-01-27

## 2023-01-12 NOTE — TELEPHONE ENCOUNTER
"Requested Prescriptions   Pending Prescriptions Disp Refills    ISIBLOOM 0.15-30 MG-MCG tablet [Pharmacy Med Name: ISIBLOOM 0.15MG-0.03MG TABLET] 84 tablet 3     Sig: TAKE 1 TABLET BY MOUTH DAILY       Contraceptives Protocol Failed - 1/12/2023  1:01 AM        Failed - Recent (12 mo) or future (30 days) visit within the authorizing provider's specialty     Patient has had an office visit with the authorizing provider or a provider within the authorizing providers department within the previous 12 mos or has a future within next 30 days. See \"Patient Info\" tab in inbasket, or \"Choose Columns\" in Meds & Orders section of the refill encounter.              Passed - Patient is not a current smoker if age is 35 or older        Passed - Medication is active on med list        Passed - No active pregnancy on record        Passed - No positive pregnancy test in past 12 months             "

## 2023-01-21 ASSESSMENT — ENCOUNTER SYMPTOMS
COUGH: 0
CONSTIPATION: 0
CHILLS: 0
HEMATOCHEZIA: 0
MYALGIAS: 0
SORE THROAT: 0
EYE PAIN: 0
BREAST MASS: 0
PARESTHESIAS: 0
FEVER: 0
NERVOUS/ANXIOUS: 0
ARTHRALGIAS: 0
JOINT SWELLING: 0
DIARRHEA: 1
HEADACHES: 0
ABDOMINAL PAIN: 0
HEMATURIA: 0
DIZZINESS: 0
HEARTBURN: 0
DYSURIA: 0
NAUSEA: 1
WEAKNESS: 0
FREQUENCY: 0
PALPITATIONS: 0
SHORTNESS OF BREATH: 0

## 2023-01-27 ENCOUNTER — OFFICE VISIT (OUTPATIENT)
Dept: FAMILY MEDICINE | Facility: OTHER | Age: 38
End: 2023-01-27
Payer: COMMERCIAL

## 2023-01-27 VITALS
HEART RATE: 74 BPM | TEMPERATURE: 97 F | DIASTOLIC BLOOD PRESSURE: 60 MMHG | SYSTOLIC BLOOD PRESSURE: 104 MMHG | HEIGHT: 66 IN | OXYGEN SATURATION: 100 % | RESPIRATION RATE: 12 BRPM | BODY MASS INDEX: 23.95 KG/M2 | WEIGHT: 149 LBS

## 2023-01-27 DIAGNOSIS — Z00.00 ROUTINE GENERAL MEDICAL EXAMINATION AT A HEALTH CARE FACILITY: Primary | ICD-10-CM

## 2023-01-27 DIAGNOSIS — Z30.41 ENCOUNTER FOR SURVEILLANCE OF CONTRACEPTIVE PILLS: ICD-10-CM

## 2023-01-27 DIAGNOSIS — E78.5 HYPERLIPIDEMIA LDL GOAL <160: ICD-10-CM

## 2023-01-27 PROCEDURE — 99213 OFFICE O/P EST LOW 20 MIN: CPT | Mod: 25 | Performed by: NURSE PRACTITIONER

## 2023-01-27 PROCEDURE — 99395 PREV VISIT EST AGE 18-39: CPT | Performed by: NURSE PRACTITIONER

## 2023-01-27 RX ORDER — DESOGESTREL AND ETHINYL ESTRADIOL 0.15-0.03
1 KIT ORAL DAILY
Qty: 84 TABLET | Refills: 3 | Status: SHIPPED | OUTPATIENT
Start: 2023-01-27 | End: 2023-12-22

## 2023-01-27 ASSESSMENT — ENCOUNTER SYMPTOMS
CONSTIPATION: 0
HEARTBURN: 0
NAUSEA: 1
MYALGIAS: 0
WEAKNESS: 0
ARTHRALGIAS: 0
ABDOMINAL PAIN: 0
EYE PAIN: 0
HEMATURIA: 0
DIARRHEA: 1
NERVOUS/ANXIOUS: 0
DIZZINESS: 0
HEADACHES: 0
BREAST MASS: 0
FEVER: 0
JOINT SWELLING: 0
PARESTHESIAS: 0
PALPITATIONS: 0
COUGH: 0
CHILLS: 0
FREQUENCY: 0
SORE THROAT: 0
SHORTNESS OF BREATH: 0
HEMATOCHEZIA: 0
DYSURIA: 0

## 2023-01-27 ASSESSMENT — PAIN SCALES - GENERAL: PAINLEVEL: NO PAIN (0)

## 2023-01-27 NOTE — PROGRESS NOTES
SUBJECTIVE:   CC: Dillan is an 37 year old who presents for preventive health visit.   Patient has been advised of split billing requirements and indicates understanding: Yes  Healthy Habits:     Getting at least 3 servings of Calcium per day:  Yes    Bi-annual eye exam:  NO    Dental care twice a year:  Yes    Sleep apnea or symptoms of sleep apnea:  None    Diet:  Low fat/cholesterol    Frequency of exercise:  2-3 days/week    Duration of exercise:  15-30 minutes    Taking medications regularly:  Yes    Medication side effects:  Lightheadedness    PHQ-2 Total Score: 0    Additional concerns today:  Yes        Today's PHQ-2 Score:   PHQ-2 ( 1999 Pfizer) 1/21/2023   Q1: Little interest or pleasure in doing things 0   Q2: Feeling down, depressed or hopeless 0   PHQ-2 Score 0   PHQ-2 Total Score (12-17 Years)- Positive if 3 or more points; Administer PHQ-A if positive -   Q1: Little interest or pleasure in doing things Not at all   Q2: Feeling down, depressed or hopeless Not at all   PHQ-2 Score 0           Social History     Tobacco Use     Smoking status: Never     Smokeless tobacco: Never   Substance Use Topics     Alcohol use: No         Alcohol Use 1/21/2023   Prescreen: >3 drinks/day or >7 drinks/week? Not Applicable   Prescreen: >3 drinks/day or >7 drinks/week? -   No flowsheet data found.    Reviewed orders with patient.  Reviewed health maintenance and updated orders accordingly - Yes  Lab work is in process    Breast Cancer Screening:    FHS-7:   Breast CA Risk Assessment (FHS-7) 12/31/2021 1/26/2023   Did any of your first-degree relatives have breast or ovarian cancer? Yes Yes   Did any of your relatives have bilateral breast cancer? No Yes   Did any man in your family have breast cancer? No No   Did any woman in your family have breast and ovarian cancer? Yes No   Did any woman in your family have breast cancer before age 50 y? No No   Do you have 2 or more relatives with breast and/or ovarian cancer? No  No   Do you have 2 or more relatives with breast and/or bowel cancer? No No     click delete button to remove this line now  Patient under 40 years of age: Routine Mammogram Screening not recommended.   Pertinent mammograms are reviewed under the imaging tab.    History of abnormal Pap smear:   NO - age 30- 65 PAP every 3 years recommended  Last 3 Pap Results:   PAP (no units)   Date Value   03/08/2017 NIL   08/23/2013 NIL   07/28/2011 NIL     PAP / HPV Latest Ref Rng & Units 1/3/2022 3/8/2017 8/23/2013   PAP   Negative for Intraepithelial Lesion or Malignancy (NILM) - -   PAP (Historical) - - NIL NIL   HPV16 Negative Negative Negative -   HPV18 Negative Negative Negative -   HRHPV Negative Negative Negative -     Reviewed and updated as needed this visit by clinical staff   Tobacco  Allergies  Meds              Reviewed and updated as needed this visit by Provider                 Past Medical History:   Diagnosis Date     NO ACTIVE PROBLEMS       Past Surgical History:   Procedure Laterality Date     COLONOSCOPY N/A 8/27/2021    Procedure: COLONOSCOPY, WITH  BIOPSY;  Surgeon: Juan Francisco Romo MD;  Location:  GI     ESOPHAGOSCOPY, GASTROSCOPY, DUODENOSCOPY (EGD), COMBINED N/A 8/27/2021    Procedure: ESOPHAGOGASTRODUODENOSCOPY (EGD);  Surgeon: Juan Francisco Romo MD;  Location:  GI     HC TOOTH EXTRACTION W/FORCEP       LAPAROSCOPIC CHOLECYSTECTOMY N/A 11/12/2020    Procedure: Laparoscopic cholecystectomy;  Surgeon: Juan Francisco Romo MD;  Location:  OR       Review of Systems  CONSTITUTIONAL: NEGATIVE for fever, chills, change in weight  INTEGUMENTARU/SKIN: NEGATIVE for worrisome rashes, moles or lesions  EYES: NEGATIVE for vision changes or irritation  ENT: NEGATIVE for ear, mouth and throat problems  RESP: NEGATIVE for significant cough or SOB  BREAST: NEGATIVE for masses, tenderness or discharge  CV: NEGATIVE for chest pain, palpitations or peripheral edema  GI: NEGATIVE for nausea, abdominal pain, heartburn, or  "change in bowel habits  : NEGATIVE for unusual urinary or vaginal symptoms. Periods are regular.  MUSCULOSKELETAL: NEGATIVE for significant arthralgias or myalgia  NEURO: NEGATIVE for weakness, dizziness or paresthesias  PSYCHIATRIC: NEGATIVE for changes in mood or affect     OBJECTIVE:   /60   Pulse 74   Temp 97  F (36.1  C) (Temporal)   Resp 12   Ht 1.687 m (5' 6.42\")   Wt 67.6 kg (149 lb)   LMP 12/23/2022   SpO2 100%   BMI 23.75 kg/m    Physical Exam  GENERAL: healthy, alert and no distress  EYES: Eyes grossly normal to inspection, PERRL and conjunctivae and sclerae normal  HENT: ear canals and TM's normal, nose and mouth without ulcers or lesions  NECK: no adenopathy, no asymmetry, masses, or scars and thyroid normal to palpation  RESP: lungs clear to auscultation - no rales, rhonchi or wheezes  CV: regular rate and rhythm, normal S1 S2, no S3 or S4, no murmur, click or rub, no peripheral edema and peripheral pulses strong  ABDOMEN: soft, nontender, no hepatosplenomegaly, no masses and bowel sounds normal  MS: no gross musculoskeletal defects noted, no edema  SKIN: no suspicious lesions or rashes  NEURO: Normal strength and tone, mentation intact and speech normal  PSYCH: mentation appears normal, affect normal/bright    Diagnostic Test Results:  Labs reviewed in Epic  No results found for any visits on 01/27/23.    ASSESSMENT/PLAN:   (Z00.00) Routine general medical examination at a health care facility  (primary encounter diagnosis)  Comment:   Plan: Updated HM  Declined vaccines today     (E78.5) Hyperlipidemia LDL goal <160  Comment: History of family cardiac disease and some hyperlipidemia  Plan: Lipid panel reflex to direct LDL Fasting        Follow up for fasting labs      (Z30.41) Encounter for surveillance of contraceptive pills  Comment:   Plan: desogestrel-ethinyl estradiol (ISIBLOOM)         0.15-30 MG-MCG tablet        Level 3       Patient has been advised of split billing " requirements and indicates understanding: Yes      COUNSELING:  Reviewed preventive health counseling, as reflected in patient instructions        She reports that she has never smoked. She has never used smokeless tobacco.      HILDA Georges CNP  [facility]

## 2023-01-27 NOTE — PROGRESS NOTES
"   SUBJECTIVE:   CC: Dillan is an 37 year old who presents for preventive health visit.   {Split Bill scripting  The purpose of this visit is to discuss your medical history and prevent health problems before you are sick. You may be responsible for a co-pay, coinsurance, or deductible if your visit today includes services such as checking on a sore throat, having an x-ray or lab test, or treating and evaluating a new or existing condition :648190}  Patient has been advised of split billing requirements and indicates understanding: Yes  Healthy Habits:     Getting at least 3 servings of Calcium per day:  Yes    Bi-annual eye exam:  NO    Dental care twice a year:  Yes    Sleep apnea or symptoms of sleep apnea:  None    Diet:  Low fat/cholesterol    Frequency of exercise:  2-3 days/week    Duration of exercise:  15-30 minutes    Taking medications regularly:  Yes    Medication side effects:  Lightheadedness    PHQ-2 Total Score: 0    Additional concerns today:  Yes    {Outside tests to abstract? :976798}    {additional problems to add (Optional):160246}    Today's PHQ-2 Score:   PHQ-2 ( 1999 Pfizer) 1/21/2023   Q1: Little interest or pleasure in doing things 0   Q2: Feeling down, depressed or hopeless 0   PHQ-2 Score 0   PHQ-2 Total Score (12-17 Years)- Positive if 3 or more points; Administer PHQ-A if positive -   Q1: Little interest or pleasure in doing things Not at all   Q2: Feeling down, depressed or hopeless Not at all   PHQ-2 Score 0           Social History     Tobacco Use     Smoking status: Never     Smokeless tobacco: Never   Substance Use Topics     Alcohol use: No         Alcohol Use 1/21/2023   Prescreen: >3 drinks/day or >7 drinks/week? Not Applicable   Prescreen: >3 drinks/day or >7 drinks/week? -       Reviewed orders with patient.  Reviewed health maintenance and updated orders accordingly - { :095726::\"Yes\"}  {Chronicprobdata (optional):353097}    Breast Cancer Screening:    FHS-7:   Breast CA Risk " "Assessment (FHS-7) 12/31/2021 1/26/2023   Did any of your first-degree relatives have breast or ovarian cancer? Yes Yes   Did any of your relatives have bilateral breast cancer? No Yes   Did any man in your family have breast cancer? No No   Did any woman in your family have breast and ovarian cancer? Yes No   Did any woman in your family have breast cancer before age 50 y? No No   Do you have 2 or more relatives with breast and/or ovarian cancer? No No   Do you have 2 or more relatives with breast and/or bowel cancer? No No     {If any of the questions to the BCRA (FHS-7) are answered yes, consider ordering referral for genetic counseling (Optional) :035687::\"click delete button to remove this line now\"}  {AMB Mammogram Decision Support (Optional) :446653}  Pertinent mammograms are reviewed under the imaging tab.    History of abnormal Pap smear: { :863959}  PAP / HPV Latest Ref Rng & Units 1/3/2022 3/8/2017 8/23/2013   PAP   Negative for Intraepithelial Lesion or Malignancy (NILM) - -   PAP (Historical) - - NIL NIL   HPV16 Negative Negative Negative -   HPV18 Negative Negative Negative -   HRHPV Negative Negative Negative -     Reviewed and updated as needed this visit by clinical staff   Tobacco  Allergies  Meds              Reviewed and updated as needed this visit by Provider                 {HISTORY OPTIONS (Optional):251847}    Review of Systems   Constitutional: Negative for chills and fever.   HENT: Negative for congestion, ear pain, hearing loss and sore throat.    Eyes: Negative for pain and visual disturbance.   Respiratory: Negative for cough and shortness of breath.    Cardiovascular: Negative for chest pain, palpitations and peripheral edema.   Gastrointestinal: Positive for diarrhea and nausea. Negative for abdominal pain, constipation, heartburn and hematochezia.   Breasts:  Negative for tenderness, breast mass and discharge.   Genitourinary: Negative for dysuria, frequency, genital sores, " "hematuria, pelvic pain, urgency, vaginal bleeding and vaginal discharge.   Musculoskeletal: Negative for arthralgias, joint swelling and myalgias.   Skin: Negative for rash.   Neurological: Negative for dizziness, weakness, headaches and paresthesias.   Psychiatric/Behavioral: Negative for mood changes. The patient is not nervous/anxious.      {FEMALE ROS (Optional):087521}     OBJECTIVE:   /60   Pulse 74   Temp 97  F (36.1  C) (Temporal)   Resp 12   Ht 1.687 m (5' 6.42\")   Wt 67.6 kg (149 lb)   LMP 12/23/2022   SpO2 100%   BMI 23.75 kg/m    Physical Exam  {Exam Choices (Optional):477252}    {Diagnostic Test Results (Optional):582052::\"Diagnostic Test Results:\",\"Labs reviewed in Epic\"}    ASSESSMENT/PLAN:   {Diag Picklist:744518}    {Patient advised of split billing (Optional):404583}      COUNSELING:  {FEMALE COUNSELING MESSAGES:937660::\"Reviewed preventive health counseling, as reflected in patient instructions\"}        She reports that she has never smoked. She has never used smokeless tobacco.      {Counseling Resources  [place] Preventive Services Task Force  Cholesterol Screening  Health diet/nutrition  Pooled Cohorts Equation Calculator  USDA's MyPlate  ASA Prophylaxis  Lung CA Screening  Osteoporosis prevention/bone health :694281}  {Breast Cancer Risk Calculator  BRCA-Related Cancer Risk Assessment FHS-7 Tool :147546}  HILDA [facility]  "

## 2023-12-22 DIAGNOSIS — Z30.41 ENCOUNTER FOR SURVEILLANCE OF CONTRACEPTIVE PILLS: ICD-10-CM

## 2023-12-22 RX ORDER — DESOGESTREL AND ETHINYL ESTRADIOL 0.15-0.03
1 KIT ORAL DAILY
Qty: 84 TABLET | Refills: 0 | Status: SHIPPED | OUTPATIENT
Start: 2023-12-22 | End: 2024-01-01

## 2024-01-01 ENCOUNTER — MYC REFILL (OUTPATIENT)
Dept: FAMILY MEDICINE | Facility: OTHER | Age: 39
End: 2024-01-01
Payer: COMMERCIAL

## 2024-01-01 DIAGNOSIS — Z30.41 ENCOUNTER FOR SURVEILLANCE OF CONTRACEPTIVE PILLS: ICD-10-CM

## 2024-01-04 RX ORDER — DESOGESTREL AND ETHINYL ESTRADIOL 0.15-0.03
1 KIT ORAL DAILY
Qty: 84 TABLET | Refills: 0 | Status: SHIPPED | OUTPATIENT
Start: 2024-01-04 | End: 2024-01-12

## 2024-01-08 ASSESSMENT — ENCOUNTER SYMPTOMS
HEMATURIA: 0
PALPITATIONS: 0
JOINT SWELLING: 0
WEAKNESS: 0
DYSURIA: 0
PARESTHESIAS: 0
HEARTBURN: 0
NAUSEA: 0
CHILLS: 0
MYALGIAS: 0
ARTHRALGIAS: 0
COUGH: 0
NERVOUS/ANXIOUS: 0
FREQUENCY: 0
DIARRHEA: 1
ABDOMINAL PAIN: 0
CONSTIPATION: 0
HEMATOCHEZIA: 0
SHORTNESS OF BREATH: 0
HEADACHES: 0
SORE THROAT: 0
EYE PAIN: 0
FEVER: 0
DIZZINESS: 0
BREAST MASS: 0

## 2024-01-12 ENCOUNTER — OFFICE VISIT (OUTPATIENT)
Dept: FAMILY MEDICINE | Facility: CLINIC | Age: 39
End: 2024-01-12
Payer: COMMERCIAL

## 2024-01-12 VITALS
RESPIRATION RATE: 16 BRPM | SYSTOLIC BLOOD PRESSURE: 102 MMHG | TEMPERATURE: 97.7 F | WEIGHT: 157.13 LBS | DIASTOLIC BLOOD PRESSURE: 68 MMHG | OXYGEN SATURATION: 99 % | HEIGHT: 67 IN | BODY MASS INDEX: 24.66 KG/M2 | HEART RATE: 72 BPM

## 2024-01-12 DIAGNOSIS — Z30.41 ENCOUNTER FOR SURVEILLANCE OF CONTRACEPTIVE PILLS: ICD-10-CM

## 2024-01-12 DIAGNOSIS — Z13.1 SCREENING FOR DIABETES MELLITUS: ICD-10-CM

## 2024-01-12 DIAGNOSIS — Z12.31 VISIT FOR SCREENING MAMMOGRAM: ICD-10-CM

## 2024-01-12 DIAGNOSIS — Z78.9 USES BIRTH CONTROL: ICD-10-CM

## 2024-01-12 DIAGNOSIS — Z00.00 ROUTINE GENERAL MEDICAL EXAMINATION AT A HEALTH CARE FACILITY: Primary | ICD-10-CM

## 2024-01-12 DIAGNOSIS — Z13.220 LIPID SCREENING: ICD-10-CM

## 2024-01-12 LAB
CHOLEST SERPL-MCNC: 219 MG/DL
FASTING STATUS PATIENT QL REPORTED: YES
HBA1C MFR BLD: 5.5 %
HDLC SERPL-MCNC: 50 MG/DL
LDLC SERPL CALC-MCNC: 139 MG/DL
NONHDLC SERPL-MCNC: 169 MG/DL
TRIGL SERPL-MCNC: 151 MG/DL

## 2024-01-12 PROCEDURE — 83036 HEMOGLOBIN GLYCOSYLATED A1C: CPT | Performed by: STUDENT IN AN ORGANIZED HEALTH CARE EDUCATION/TRAINING PROGRAM

## 2024-01-12 PROCEDURE — 99396 PREV VISIT EST AGE 40-64: CPT | Performed by: STUDENT IN AN ORGANIZED HEALTH CARE EDUCATION/TRAINING PROGRAM

## 2024-01-12 PROCEDURE — 36415 COLL VENOUS BLD VENIPUNCTURE: CPT | Performed by: STUDENT IN AN ORGANIZED HEALTH CARE EDUCATION/TRAINING PROGRAM

## 2024-01-12 PROCEDURE — 80061 LIPID PANEL: CPT | Performed by: STUDENT IN AN ORGANIZED HEALTH CARE EDUCATION/TRAINING PROGRAM

## 2024-01-12 ASSESSMENT — ENCOUNTER SYMPTOMS
CONSTIPATION: 0
SHORTNESS OF BREATH: 0
HEARTBURN: 0
DIARRHEA: 1
PALPITATIONS: 0
NERVOUS/ANXIOUS: 0
HEADACHES: 0
EYE PAIN: 0
MYALGIAS: 0
JOINT SWELLING: 0
PARESTHESIAS: 0
COUGH: 0
SORE THROAT: 0
FEVER: 0
HEMATOCHEZIA: 0
BREAST MASS: 0
WEAKNESS: 0
FREQUENCY: 0
CHILLS: 0
ABDOMINAL PAIN: 0
HEMATURIA: 0
DYSURIA: 0
DIZZINESS: 0
ARTHRALGIAS: 0
NAUSEA: 0

## 2024-01-12 ASSESSMENT — PAIN SCALES - GENERAL: PAINLEVEL: NO PAIN (0)

## 2024-01-12 NOTE — PROGRESS NOTES
SUBJECTIVE:   Dillan is a 38 year old, presenting for the following:  Physical        1/12/2024     8:08 AM   Additional Questions   Roomed by Amanda MACKAY       Healthy Habits:     Getting at least 3 servings of Calcium per day:  Yes    Bi-annual eye exam:  NO    Dental care twice a year:  Yes    Sleep apnea or symptoms of sleep apnea:  None    Diet:  Breakfast skipped    Frequency of exercise:  2-3 days/week    Duration of exercise:  15-30 minutes    Taking medications regularly:  Yes    Medication side effects:  None    Additional concerns today:  No      Today's PHQ-2 Score:       1/12/2024     7:39 AM   PHQ-2 ( 1999 Pfizer)   Q1: Little interest or pleasure in doing things 0   Q2: Feeling down, depressed or hopeless 0   PHQ-2 Score 0   Q1: Little interest or pleasure in doing things Not at all   Q2: Feeling down, depressed or hopeless Not at all   PHQ-2 Score 0           Social History     Tobacco Use    Smoking status: Never    Smokeless tobacco: Never   Substance Use Topics    Alcohol use: No             1/8/2024     7:45 PM   Alcohol Use   Prescreen: >3 drinks/day or >7 drinks/week? No     Reviewed orders with patient.  Reviewed health maintenance and updated orders accordingly - Yes  Labs reviewed in EPIC  BP Readings from Last 3 Encounters:   01/12/24 102/68   01/27/23 104/60   01/03/22 108/64    Wt Readings from Last 3 Encounters:   01/12/24 71.3 kg (157 lb 2 oz)   01/27/23 67.6 kg (149 lb)   01/03/22 68.9 kg (152 lb)                  Patient Active Problem List   Diagnosis    Contraception    CARDIOVASCULAR SCREENING; LDL GOAL LESS THAN 160    Hyperlipidemia LDL goal <160    Biliary colic symptom    Chronic cholecystitis with calculus    Chronic diarrhea    Food allergy    Encounter for surveillance of contraceptive pills     Past Surgical History:   Procedure Laterality Date    BIOPSY  August 2021    Samples taken from colonoscopy and endoscopy    COLONOSCOPY N/A 08/27/2021    Procedure: COLONOSCOPY, WITH   BIOPSY;  Surgeon: Juan Francisco Romo MD;  Location: [facility]    ESOPHAGOSCOPY, GASTROSCOPY, DUODENOSCOPY (EGD), COMBINED N/A 08/27/2021    Procedure: ESOPHAGOGASTRODUODENOSCOPY (EGD);  Surgeon: Juan Francisco Romo MD;  Location: [facility]    HC TOOTH EXTRACTION W/FORCEP      LAPAROSCOPIC CHOLECYSTECTOMY N/A 11/12/2020    Procedure: Laparoscopic cholecystectomy;  Surgeon: Juan Francisco Romo MD;  Location: [facility]       Social History     Tobacco Use    Smoking status: Never    Smokeless tobacco: Never   Substance Use Topics    Alcohol use: No     Family History   Problem Relation Age of Onset    Hyperlipidemia Mother     Hyperlipidemia Father     Gestational Diabetes Sister     Hypertension Brother     Hyperlipidemia Brother     Diabetes Maternal Grandmother     Cancer Maternal Grandmother         ovarian CA in her 70s    Lung Cancer Maternal Grandfather         smoker, age 56    Hypertension Paternal Grandmother     Cerebrovascular Disease Paternal Grandmother         stroke, 80s    Hypertension Paternal Grandfather     Heart Disease Paternal Grandfather         MI at age 56    Gastrointestinal Disease Other         stomach issues on mom's side, no diagnosis         Current Outpatient Medications   Medication Sig Dispense Refill    bismuth subsalicylate (PEPTO BISMOL) 262 MG chewable tablet Take 1 tablet by mouth 4 times daily (before meals and nightly)      desogestrel-ethinyl estradiol (ISIBLOOM) 0.15-30 MG-MCG tablet Take 1 tablet by mouth daily 84 tablet 0     Allergies   Allergen Reactions    Eden Oil      Mild allergy to almond based on everlywell tests    Chicken Allergy      Mild chicken allergy based on everlywell tests    Eggs Or Egg-Derived Products [Chicken-Derived Products (Egg)]      Based on everlywell tests    No Known Drug Allergy     Other Food Allergy      Mild allergy to turkey based on everlywell tests     Recent Labs   Lab Test 01/03/22  1032 07/13/21  1807 03/24/20  1034 04/19/19  0834 03/08/17  0936   *   --   --  143* 126*   HDL 66  --   --  57 50   TRIG 149  --   --  115 79   ALT  --  21 19  --   --    CR  --  0.83 0.70  --   --    GFRESTIMATED  --  90 >90  --   --    GFRESTBLACK  --   --  >90  --   --    POTASSIUM  --  3.7 3.8  --   --    TSH  --  1.59  --   --   --         Breast Cancer Screening:    FHS-7:       12/31/2021     7:40 AM 1/26/2023     9:58 AM 1/8/2024     7:48 PM   Breast CA Risk Assessment (FHS-7)   Did any of your first-degree relatives have breast or ovarian cancer? Yes Yes Yes   Did any of your relatives have bilateral breast cancer? No Yes No   Did any man in your family have breast cancer? No No No   Did any woman in your family have breast and ovarian cancer? Yes No No   Did any woman in your family have breast cancer before age 50 y? No No No   Do you have 2 or more relatives with breast and/or ovarian cancer? No No No   Do you have 2 or more relatives with breast and/or bowel cancer? No No No     Maternal side Grandmother had ovarian. Mother was screened genetically and was negative.   Mammogram Screening - Offered annual screening and updated Health Maintenance for [place] plan based on risk factor consideration  Pertinent mammograms are reviewed under the imaging tab.    History of abnormal Pap smear: NO - age 30-65 PAP every 5 years with negative HPV co-testing recommended      Latest Ref Rng & Units 1/3/2022     9:47 AM 3/8/2017     9:37 AM 3/8/2017     9:36 AM   PAP / HPV   PAP  Negative for Intraepithelial Lesion or Malignancy (NILM)      PAP (Historical)    NIL    HPV 16 DNA Negative Negative  Negative     HPV 18 DNA Negative Negative  Negative     Other HR HPV Negative Negative  Negative       Reviewed and updated as needed this visit by clinical staff   Tobacco  Allergies  Meds  Problems  Med Hx  Surg Hx  Fam Hx          Reviewed and updated as needed this visit by Provider   Tobacco  Allergies  Meds  Problems  Med Hx  Surg Hx  Fam Hx         Past Medical History:    Diagnosis Date    NO ACTIVE PROBLEMS       Past Surgical History:   Procedure Laterality Date    BIOPSY  2021    Samples taken from colonoscopy and endoscopy    COLONOSCOPY N/A 2021    Procedure: COLONOSCOPY, WITH  BIOPSY;  Surgeon: Juan Francisco Romo MD;  [facility]    ESOPHAGOSCOPY, GASTROSCOPY, DUODENOSCOPY (EGD), COMBINED N/A 2021    Procedure: ESOPHAGOGASTRODUODENOSCOPY (EGD);  Surgeon: Juan Francisco Romo MD;  [facility]    HC TOOTH EXTRACTION W/FORCEP      LAPAROSCOPIC CHOLECYSTECTOMY N/A 2020    Procedure: Laparoscopic cholecystectomy;  Surgeon: Juan Francisco Romo MD;  [facility]     OB History    Para Term  AB Living   2 2 2 0 0 2   SAB IAB Ectopic Multiple Live Births   0 0 0 0 2      # Outcome Date GA Lbr Juan/2nd Weight Sex Delivery Anes PTL Lv   2 Term 12 39w5d 04:00 / 01:07 4.685 kg (10 lb 5.3 oz) M Vag-Spont EPI N REYNALDO      Name: Mike      Apgar1: 7  Apgar5: 9   1 Term 10/25/09 39w0d 13:00 3.487 kg (7 lb 11 oz) F Vag-Vacuum  N REYNALDO      Birth Comments: Uncomplicated       Name: Trena       Review of Systems   Constitutional:  Negative for chills and fever.   HENT:  Negative for congestion, ear pain, hearing loss and sore throat.    Eyes:  Negative for pain and visual disturbance.   Respiratory:  Negative for cough and shortness of breath.    Cardiovascular:  Negative for chest pain, palpitations and peripheral edema.   Gastrointestinal:  Positive for diarrhea. Negative for abdominal pain, constipation, heartburn, hematochezia and nausea.   Breasts:  Negative for tenderness, breast mass and discharge.   Genitourinary:  Negative for dysuria, frequency, genital sores, hematuria, pelvic pain, urgency, vaginal bleeding and vaginal discharge.   Musculoskeletal:  Negative for arthralgias, joint swelling and myalgias.   Skin:  Negative for rash.   Neurological:  Negative for dizziness, weakness, headaches and paresthesias.   Psychiatric/Behavioral:  Negative  "for mood changes. The patient is not nervous/anxious.      OBJECTIVE:   /68   Pulse 72   Temp 97.7  F (36.5  C) (Temporal)   Resp 16   Ht 1.69 m (5' 6.54\")   Wt 71.3 kg (157 lb 2 oz)   LMP 12/30/2023   SpO2 99%   BMI 24.95 kg/m    Physical Exam  GENERAL: healthy, alert and no distress  EYES: Eyes grossly normal to inspection, PERRL and conjunctivae and sclerae normal  HENT: ear canals and TM's normal, nose and mouth without ulcers or lesions  NECK: no adenopathy, no asymmetry, masses, or scars and thyroid normal to palpation  RESP: lungs clear to auscultation - no rales, rhonchi or wheezes  BREAST: declined  CV: regular rate and rhythm, no peripheral edema   ABDOMEN: soft, nontender, non distended, no hepatosplenomegaly, no masses and bowel sounds normal  MS: no gross musculoskeletal defects noted, no edema  SKIN: no suspicious lesions or rashes  NEURO: Normal strength and tone, mentation intact and speech normal  PSYCH: mentation appears normal, affect normal/bright    Diagnostic Test Results:  Labs reviewed in Epic  Results for orders placed or performed in visit on 01/12/24   Hemoglobin A1c     Status: Normal   Result Value Ref Range    Hemoglobin A1C 5.5 <5.7 %   Lipid panel reflex to direct LDL Fasting     Status: Abnormal   Result Value Ref Range    Cholesterol 219 (H) <200 mg/dL    Triglycerides 151 (H) <150 mg/dL    Direct Measure HDL 50 >=50 mg/dL    LDL Cholesterol Calculated 139 (H) <=100 mg/dL    Non HDL Cholesterol 169 (H) <130 mg/dL    Patient Fasting > 8hrs? Yes     Narrative    Cholesterol  Desirable:  <200 mg/dL    Triglycerides  Normal:  Less than 150 mg/dL  Borderline High:  150-199 mg/dL  High:  200-499 mg/dL  Very High:  Greater than or equal to 500 mg/dL    Direct Measure HDL  Female:  Greater than or equal to 50 mg/dL   Male:  Greater than or equal to 40 mg/dL    LDL Cholesterol  Desirable:  <100mg/dL  Above Desirable:  100-129 mg/dL   Borderline High:  130-159 mg/dL   High:  " 160-189 mg/dL   Very High:  >= 190 mg/dL    Non HDL Cholesterol  Desirable:  130 mg/dL  Above Desirable:  130-159 mg/dL  Borderline High:  160-189 mg/dL  High:  190-219 mg/dL  Very High:  Greater than or equal to 220 mg/dL       ASSESSMENT/PLAN:   (Z00.00) Routine general medical examination at a health care facility  (primary encounter diagnosis)  Comment: Recommend annual wellness visits, screens, and immunizations as indicated.     (Z13.220) Lipid screening  Comment: screen, results above with numerous abnormalities though these were stable vs improved from prior studies. Suggestion to consider trial of statin vs concerted efforts for dietary modification and exercise with recheck in 2-6 months. Awaiting patient response.  Plan: Lipid panel reflex to direct LDL Fasting    (Z13.1) Screening for diabetes mellitus  Comment: screen,   Plan: Hemoglobin A1c, wnl    (Z12.31) Visit for screening mammogram  Comment: screen ordered  Plan: MA Screen Bilateral w/Chad     (Z78.9) Uses birth control  (Z30.41) Encounter for surveillance of contraceptive pills  Comment: Desires to continue current rx. Refill sent.  Plan: desogestrel-ethinyl estradiol (ISIBLOOM)         0.15-30 MG-MCG tablet        Patient has been advised of split billing requirements and indicates understanding: Yes      COUNSELING:  Reviewed preventive health counseling, as reflected in patient instructions       Regular exercise       Healthy diet/nutrition       Vision screening       Hearing screening       Immunizations  Declined: Covid-19, Hepatitis B, and Influenza due to Conscientious objector           Contraception        She reports that she has never smoked. She has never used smokeless tobacco.          Meenu Bonds, DO   [facility]

## 2024-01-14 RX ORDER — DESOGESTREL AND ETHINYL ESTRADIOL 0.15-0.03
1 KIT ORAL DAILY
Qty: 84 TABLET | Refills: 2 | Status: SHIPPED | OUTPATIENT
Start: 2024-01-14

## 2024-03-16 ENCOUNTER — HEALTH MAINTENANCE LETTER (OUTPATIENT)
Age: 39
End: 2024-03-16

## 2024-08-07 NOTE — PROGRESS NOTES
Home Care received a Referral for Nursing, Physical Therapy, and Occupational Therapy. We have processed the referral for a Start of Care on 24-48 Hrs.     If you have any questions or concerns, please feel free to contact us at [phone]. Follow the prompts, enter your five digit zip code, and you will be directed to your care team on [place] 3.      [facility] [place] MD, MPH  02/05/2019        History:      Rosemarie Wade is a 33 year old female with a chief complaint of sore throat.  Onset of symptoms was 2 day(s) ago.    No dyspnea or wheezing or cough  No vomiting    No diarrhea  No abdominal pain  Eating and drinking well  Making urine well         Assessment and Plan:         Acute pharyngitis:    - RAPID STREP SCREEN: positive  - penicillin V (VEETID) 500 MG tablet; Take 1 tablet (500 mg) by mouth 3 times daily for 10 days  Dispense: 30 tablet; Refill: 0    Advised patient to increase/encourage fluid intake and rest.  Advised to discard current tooth brush.  Advised to stay home and rest today and tomorrow.  Tylenol  Every 6 hours as needed alternating with ibuprofen every 6 hours as needed for pain and fever.  F/u w PCP in 4-5 days, earlier if symptoms worsen.                   Physical Exam:      /70   Pulse 97   Temp 99.4  F (37.4  C) (Oral)      Constitutional: Patient is in no distress The patient is pleasant and cooperative.   HEENT: Head:  Head is atraumatic, normocephalic.    Eyes: Pupils are equal, round and reactive to light and accomodation.  Sclera is non-icteric. No conjunctival injection, or exudate noted. Extraocular motion is intact. Visual acuity is intact bilaterally.  Ears:  External acoustic canals are patent and clear.  There is no erythema and bulging( exudate)  of the ( R/L ) tympanic membrane(s ).   Nose:  No Nasal congestion, drainage or mucosal ulceration is noted.  Throat:  Oral mucosa is moist.  No oral lesions are noted.  Posterior pharyngeal hyperemia w exudate noted.     Neck Supple.  There is cervical lymphadenopathy.  No nuchal rigidity noted.  There is no meningismus.     Cardiovascular:  Chest Wall: Heart is regular to rate and rhythm.  No murmur is noted.       Lungs: Clear in the anterior and posterior pulmonary fields.   Abdomen: Soft and non-tender.    Back No flank  tenderness is noted.   Extremeties No edema, no calf tenderness.   Neuro: No focal deficit.   Skin No petechiae or purpura is noted.  There is no rash.   Mood Normal              Data:      All new lab and imaging data was reviewed.   Results for orders placed or performed in visit on 02/05/19   RAPID STREP SCREEN   Result Value Ref Range    Rapid Strep A Screen pos (A) neg

## 2024-11-30 DIAGNOSIS — Z30.41 ENCOUNTER FOR SURVEILLANCE OF CONTRACEPTIVE PILLS: ICD-10-CM

## 2024-12-02 RX ORDER — DESOGESTREL AND ETHINYL ESTRADIOL 0.15-0.03
1 KIT ORAL DAILY
Qty: 84 TABLET | Refills: 0 | Status: SHIPPED | OUTPATIENT
Start: 2024-12-02

## 2025-01-20 ENCOUNTER — PATIENT OUTREACH (OUTPATIENT)
Dept: CARE COORDINATION | Facility: CLINIC | Age: 40
End: 2025-01-20
Payer: COMMERCIAL
